# Patient Record
Sex: MALE | Race: WHITE | Employment: FULL TIME | URBAN - METROPOLITAN AREA
[De-identification: names, ages, dates, MRNs, and addresses within clinical notes are randomized per-mention and may not be internally consistent; named-entity substitution may affect disease eponyms.]

---

## 2023-09-30 ENCOUNTER — APPOINTMENT (OUTPATIENT)
Dept: MRI IMAGING | Facility: HOSPITAL | Age: 53
End: 2023-09-30
Attending: HOSPITALIST
Payer: COMMERCIAL

## 2023-09-30 ENCOUNTER — APPOINTMENT (OUTPATIENT)
Dept: CT IMAGING | Facility: HOSPITAL | Age: 53
End: 2023-09-30
Attending: EMERGENCY MEDICINE
Payer: COMMERCIAL

## 2023-09-30 ENCOUNTER — APPOINTMENT (OUTPATIENT)
Dept: GENERAL RADIOLOGY | Facility: HOSPITAL | Age: 53
End: 2023-09-30
Attending: HOSPITALIST
Payer: COMMERCIAL

## 2023-09-30 ENCOUNTER — HOSPITAL ENCOUNTER (INPATIENT)
Facility: HOSPITAL | Age: 53
LOS: 12 days | Discharge: HOME HEALTH | End: 2023-10-12
Attending: EMERGENCY MEDICINE | Admitting: INTERNAL MEDICINE
Payer: COMMERCIAL

## 2023-09-30 ENCOUNTER — APPOINTMENT (OUTPATIENT)
Dept: GENERAL RADIOLOGY | Facility: HOSPITAL | Age: 53
End: 2023-09-30
Attending: EMERGENCY MEDICINE
Payer: COMMERCIAL

## 2023-09-30 DIAGNOSIS — L03.114 CELLULITIS OF LEFT UPPER EXTREMITY: Primary | ICD-10-CM

## 2023-09-30 DIAGNOSIS — L03.90 CELLULITIS: ICD-10-CM

## 2023-09-30 DIAGNOSIS — L03.114 CELLULITIS OF LEFT FOREARM: ICD-10-CM
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
## 2023-09-30 DIAGNOSIS — A41.9 SEPSIS, DUE TO UNSPECIFIED ORGANISM, UNSPECIFIED WHETHER ACUTE ORGAN DYSFUNCTION PRESENT (HCC): ICD-10-CM

## 2023-09-30 DIAGNOSIS — D70.9 NEUTROPENIA, UNSPECIFIED TYPE (HCC): ICD-10-CM

## 2023-09-30 LAB
ALBUMIN SERPL-MCNC: 3.1 G/DL (ref 3.4–5)
ALBUMIN/GLOB SERPL: 0.6 {RATIO} (ref 1–2)
ALP LIVER SERPL-CCNC: 43 U/L
ALT SERPL-CCNC: 47 U/L
ANION GAP SERPL CALC-SCNC: 7 MMOL/L (ref 0–18)
APTT PPP: 37.8 SECONDS (ref 23.3–35.6)
AST SERPL-CCNC: 28 U/L (ref 15–37)
BASOPHILS # BLD AUTO: 0 X10(3) UL (ref 0–0.2)
BASOPHILS NFR BLD AUTO: 0 %
BILIRUB SERPL-MCNC: 0.6 MG/DL (ref 0.1–2)
BUN BLD-MCNC: 12 MG/DL (ref 7–18)
CALCIUM BLD-MCNC: 8.2 MG/DL (ref 8.5–10.1)
CHLORIDE SERPL-SCNC: 104 MMOL/L (ref 98–112)
CO2 SERPL-SCNC: 21 MMOL/L (ref 21–32)
CREAT BLD-MCNC: 0.85 MG/DL
D DIMER PPP FEU-MCNC: 1.24 UG/ML FEU (ref ?–0.53)
EGFRCR SERPLBLD CKD-EPI 2021: 104 ML/MIN/1.73M2 (ref 60–?)
EOSINOPHIL # BLD AUTO: 0 X10(3) UL (ref 0–0.7)
EOSINOPHIL NFR BLD AUTO: 0 %
ERYTHROCYTE [DISTWIDTH] IN BLOOD BY AUTOMATED COUNT: 14 %
FIBRINOGEN PPP-MCNC: 525 MG/DL (ref 180–480)
GLOBULIN PLAS-MCNC: 4.8 G/DL (ref 2.8–4.4)
GLUCOSE BLD-MCNC: 121 MG/DL (ref 70–99)
HCT VFR BLD AUTO: 36.6 %
HGB BLD-MCNC: 12.8 G/DL
IMM GRANULOCYTES # BLD AUTO: 0.01 X10(3) UL (ref 0–1)
IMM GRANULOCYTES NFR BLD: 0.6 %
INR BLD: 1.29 (ref 0.85–1.16)
LACTATE SERPL-SCNC: 0.9 MMOL/L (ref 0.4–2)
LYMPHOCYTES # BLD AUTO: 0.97 X10(3) UL (ref 1–4)
LYMPHOCYTES NFR BLD AUTO: 59.9 %
MCH RBC QN AUTO: 27.8 PG (ref 26–34)
MCHC RBC AUTO-ENTMCNC: 35 G/DL (ref 31–37)
MCV RBC AUTO: 79.4 FL
MONOCYTES # BLD AUTO: 0.51 X10(3) UL (ref 0.1–1)
MONOCYTES NFR BLD AUTO: 31.5 %
NEUTROPHILS # BLD AUTO: 0.13 X10 (3) UL (ref 1.5–7.7)
NEUTROPHILS # BLD AUTO: 0.13 X10(3) UL (ref 1.5–7.7)
NEUTROPHILS NFR BLD AUTO: 8 %
OSMOLALITY SERPL CALC.SUM OF ELEC: 275 MOSM/KG (ref 275–295)
PLATELET # BLD AUTO: 111 10(3)UL (ref 150–450)
POTASSIUM SERPL-SCNC: 4.1 MMOL/L (ref 3.5–5.1)
PROT SERPL-MCNC: 7.9 G/DL (ref 6.4–8.2)
PROTHROMBIN TIME: 16.1 SECONDS (ref 11.6–14.8)
RBC # BLD AUTO: 4.61 X10(6)UL
SODIUM SERPL-SCNC: 132 MMOL/L (ref 136–145)
WBC # BLD AUTO: 1.6 X10(3) UL (ref 4–11)

## 2023-09-30 PROCEDURE — 73220 MRI UPPR EXTREMITY W/O&W/DYE: CPT | Performed by: HOSPITALIST

## 2023-09-30 PROCEDURE — 70150 X-RAY EXAM OF FACIAL BONES: CPT | Performed by: HOSPITALIST

## 2023-09-30 PROCEDURE — 87040 BLOOD CULTURE FOR BACTERIA: CPT | Performed by: EMERGENCY MEDICINE

## 2023-09-30 PROCEDURE — 83605 ASSAY OF LACTIC ACID: CPT | Performed by: EMERGENCY MEDICINE

## 2023-09-30 PROCEDURE — 85384 FIBRINOGEN ACTIVITY: CPT | Performed by: EMERGENCY MEDICINE

## 2023-09-30 PROCEDURE — 85730 THROMBOPLASTIN TIME PARTIAL: CPT | Performed by: EMERGENCY MEDICINE

## 2023-09-30 PROCEDURE — 73110 X-RAY EXAM OF WRIST: CPT | Performed by: EMERGENCY MEDICINE

## 2023-09-30 PROCEDURE — A9575 INJ GADOTERATE MEGLUMI 0.1ML: HCPCS | Performed by: INTERNAL MEDICINE

## 2023-09-30 PROCEDURE — 85025 COMPLETE CBC W/AUTO DIFF WBC: CPT | Performed by: EMERGENCY MEDICINE

## 2023-09-30 PROCEDURE — 73201 CT UPPER EXTREMITY W/DYE: CPT | Performed by: EMERGENCY MEDICINE

## 2023-09-30 PROCEDURE — 70030 X-RAY EYE FOR FOREIGN BODY: CPT | Performed by: HOSPITALIST

## 2023-09-30 PROCEDURE — 85379 FIBRIN DEGRADATION QUANT: CPT | Performed by: EMERGENCY MEDICINE

## 2023-09-30 PROCEDURE — 80053 COMPREHEN METABOLIC PANEL: CPT | Performed by: EMERGENCY MEDICINE

## 2023-09-30 PROCEDURE — 85610 PROTHROMBIN TIME: CPT | Performed by: EMERGENCY MEDICINE

## 2023-09-30 RX ORDER — ACETAMINOPHEN 500 MG
1000 TABLET ORAL ONCE
Status: COMPLETED | OUTPATIENT
Start: 2023-09-30 | End: 2023-09-30

## 2023-09-30 RX ORDER — SODIUM CHLORIDE, SODIUM LACTATE, POTASSIUM CHLORIDE, CALCIUM CHLORIDE 600; 310; 30; 20 MG/100ML; MG/100ML; MG/100ML; MG/100ML
INJECTION, SOLUTION INTRAVENOUS CONTINUOUS
Status: DISCONTINUED | OUTPATIENT
Start: 2023-09-30 | End: 2023-10-04

## 2023-09-30 RX ORDER — BISACODYL 10 MG
10 SUPPOSITORY, RECTAL RECTAL
Status: DISCONTINUED | OUTPATIENT
Start: 2023-09-30 | End: 2023-10-12

## 2023-09-30 RX ORDER — SENNOSIDES 8.6 MG
17.2 TABLET ORAL NIGHTLY PRN
Status: DISCONTINUED | OUTPATIENT
Start: 2023-09-30 | End: 2023-10-12

## 2023-09-30 RX ORDER — HYDROCODONE BITARTRATE AND ACETAMINOPHEN 5; 325 MG/1; MG/1
1 TABLET ORAL EVERY 4 HOURS PRN
Status: DISCONTINUED | OUTPATIENT
Start: 2023-09-30 | End: 2023-10-12

## 2023-09-30 RX ORDER — POLYETHYLENE GLYCOL 3350 17 G/17G
17 POWDER, FOR SOLUTION ORAL DAILY PRN
Status: DISCONTINUED | OUTPATIENT
Start: 2023-09-30 | End: 2023-10-12

## 2023-09-30 RX ORDER — METRONIDAZOLE 500 MG/100ML
500 INJECTION, SOLUTION INTRAVENOUS ONCE
Status: COMPLETED | OUTPATIENT
Start: 2023-09-30 | End: 2023-09-30

## 2023-09-30 RX ORDER — ENEMA 19; 7 G/133ML; G/133ML
1 ENEMA RECTAL ONCE AS NEEDED
Status: DISCONTINUED | OUTPATIENT
Start: 2023-09-30 | End: 2023-10-12

## 2023-09-30 RX ORDER — HYDROCODONE BITARTRATE AND ACETAMINOPHEN 5; 325 MG/1; MG/1
2 TABLET ORAL EVERY 4 HOURS PRN
Status: DISCONTINUED | OUTPATIENT
Start: 2023-09-30 | End: 2023-10-12

## 2023-09-30 RX ORDER — MORPHINE SULFATE 2 MG/ML
2 INJECTION, SOLUTION INTRAMUSCULAR; INTRAVENOUS EVERY 2 HOUR PRN
Status: DISCONTINUED | OUTPATIENT
Start: 2023-09-30 | End: 2023-10-12

## 2023-09-30 RX ORDER — SODIUM CHLORIDE 9 MG/ML
INJECTION, SOLUTION INTRAVENOUS CONTINUOUS
Status: ACTIVE | OUTPATIENT
Start: 2023-09-30 | End: 2023-09-30

## 2023-09-30 RX ORDER — METRONIDAZOLE 500 MG/100ML
500 INJECTION, SOLUTION INTRAVENOUS EVERY 8 HOURS
Status: DISCONTINUED | OUTPATIENT
Start: 2023-09-30 | End: 2023-10-02

## 2023-09-30 RX ORDER — PROCHLORPERAZINE EDISYLATE 5 MG/ML
5 INJECTION INTRAMUSCULAR; INTRAVENOUS EVERY 8 HOURS PRN
Status: DISCONTINUED | OUTPATIENT
Start: 2023-09-30 | End: 2023-10-12

## 2023-09-30 RX ORDER — ENOXAPARIN SODIUM 100 MG/ML
40 INJECTION SUBCUTANEOUS DAILY
Status: DISCONTINUED | OUTPATIENT
Start: 2023-10-01 | End: 2023-10-05

## 2023-09-30 RX ORDER — MORPHINE SULFATE 2 MG/ML
1 INJECTION, SOLUTION INTRAMUSCULAR; INTRAVENOUS EVERY 2 HOUR PRN
Status: DISCONTINUED | OUTPATIENT
Start: 2023-09-30 | End: 2023-10-12

## 2023-09-30 RX ORDER — ACETAMINOPHEN 325 MG/1
650 TABLET ORAL EVERY 4 HOURS PRN
Status: DISCONTINUED | OUTPATIENT
Start: 2023-09-30 | End: 2023-10-12

## 2023-09-30 RX ORDER — MORPHINE SULFATE 4 MG/ML
4 INJECTION, SOLUTION INTRAMUSCULAR; INTRAVENOUS EVERY 2 HOUR PRN
Status: DISCONTINUED | OUTPATIENT
Start: 2023-09-30 | End: 2023-10-12

## 2023-09-30 RX ORDER — GADOTERATE MEGLUMINE 376.9 MG/ML
20 INJECTION INTRAVENOUS
Status: COMPLETED | OUTPATIENT
Start: 2023-09-30 | End: 2023-09-30

## 2023-09-30 RX ORDER — ONDANSETRON 2 MG/ML
4 INJECTION INTRAMUSCULAR; INTRAVENOUS EVERY 6 HOURS PRN
Status: DISCONTINUED | OUTPATIENT
Start: 2023-09-30 | End: 2023-10-12

## 2023-09-30 RX ORDER — VANCOMYCIN 2 GRAM/500 ML IN 0.9 % SODIUM CHLORIDE INTRAVENOUS
2000 ONCE
Status: COMPLETED | OUTPATIENT
Start: 2023-09-30 | End: 2023-09-30

## 2023-09-30 NOTE — ED INITIAL ASSESSMENT (HPI)
Pt. Presents to ED after being bit by a cat on Thursday. Cat bite to left wrist. Patient not previously evaluated for bite. Not currently on antibiotics. Was seen at [address] [place] [address] today and given one dose of IV antibiotics.

## 2023-09-30 NOTE — PROGRESS NOTES
Ortho Consult    Left forearm cat bite and cellulitis  Thomas out cellulitis  Appreciate ID recs  NPO Mn  Added on for OR tomorrow if condition does not improve    Wicho Christopher MD  [facility]

## 2023-09-30 NOTE — PLAN OF CARE
Patient is alert and oriented x4. Pain last rated a 6/10. PRN Medication given. No complaint of numbness or tingling. Pulses bilateral +2. Weak hand  to LUE d/t pain. LUE is Red and swollen, hot to the touch, no drainage noted. Vital signs in normal range, heart rate is tachy in the 120's, hospitalist notified. Patient ambulates ad barbara with steady gait. IS and ankle pumps encouraged. Belongings within reach. Encouraged to call for any needs.

## 2023-09-30 NOTE — ED QUICK NOTES
Orders for admission, patient is aware of plan and ready to go upstairs. Any questions, please call ED RN margarita at extension [phone].      Patient Covid vaccination status: Fully vaccinated     COVID Test Ordered in ED: None    COVID Suspicion at Admission: N/A    Running Infusions:      Mental Status/LOC at time of transport: x3    Other pertinent information:   CIWA score: N/A   NIH score:  N/A

## 2023-10-01 ENCOUNTER — ANESTHESIA (OUTPATIENT)
Dept: SURGERY | Facility: HOSPITAL | Age: 53
End: 2023-10-01
Payer: COMMERCIAL

## 2023-10-01 ENCOUNTER — ANESTHESIA EVENT (OUTPATIENT)
Dept: SURGERY | Facility: HOSPITAL | Age: 53
End: 2023-10-01
Payer: COMMERCIAL

## 2023-10-01 LAB
ALBUMIN SERPL-MCNC: 2.7 G/DL (ref 3.4–5)
ALBUMIN/GLOB SERPL: 0.7 {RATIO} (ref 1–2)
ALP LIVER SERPL-CCNC: 48 U/L
ALT SERPL-CCNC: 39 U/L
ANION GAP SERPL CALC-SCNC: 7 MMOL/L (ref 0–18)
AST SERPL-CCNC: 17 U/L (ref 15–37)
BASOPHILS # BLD AUTO: 0 X10(3) UL (ref 0–0.2)
BASOPHILS NFR BLD AUTO: 0 %
BILIRUB SERPL-MCNC: 0.5 MG/DL (ref 0.1–2)
BUN BLD-MCNC: 10 MG/DL (ref 7–18)
CALCIUM BLD-MCNC: 8 MG/DL (ref 8.5–10.1)
CHLORIDE SERPL-SCNC: 104 MMOL/L (ref 98–112)
CO2 SERPL-SCNC: 22 MMOL/L (ref 21–32)
CREAT BLD-MCNC: 0.89 MG/DL
D DIMER PPP FEU-MCNC: 1.46 UG/ML FEU (ref ?–0.53)
EGFRCR SERPLBLD CKD-EPI 2021: 102 ML/MIN/1.73M2 (ref 60–?)
EOSINOPHIL # BLD AUTO: 0 X10(3) UL (ref 0–0.7)
EOSINOPHIL NFR BLD AUTO: 0 %
ERYTHROCYTE [DISTWIDTH] IN BLOOD BY AUTOMATED COUNT: 13.9 %
FIBRINOGEN PPP-MCNC: 558 MG/DL (ref 180–480)
GLOBULIN PLAS-MCNC: 4 G/DL (ref 2.8–4.4)
GLUCOSE BLD-MCNC: 117 MG/DL (ref 70–99)
HCT VFR BLD AUTO: 32.4 %
HGB BLD-MCNC: 11.2 G/DL
IMM GRANULOCYTES # BLD AUTO: 0.01 X10(3) UL (ref 0–1)
IMM GRANULOCYTES NFR BLD: 0.6 %
INR BLD: 1.28 (ref 0.85–1.16)
LYMPHOCYTES # BLD AUTO: 0.98 X10(3) UL (ref 1–4)
LYMPHOCYTES NFR BLD AUTO: 58.7 %
MAGNESIUM SERPL-MCNC: 1.9 MG/DL (ref 1.6–2.6)
MCH RBC QN AUTO: 27.5 PG (ref 26–34)
MCHC RBC AUTO-ENTMCNC: 34.6 G/DL (ref 31–37)
MCV RBC AUTO: 79.6 FL
MONOCYTES # BLD AUTO: 0.54 X10(3) UL (ref 0.1–1)
MONOCYTES NFR BLD AUTO: 32.3 %
NEUTROPHILS # BLD AUTO: 0.14 X10 (3) UL (ref 1.5–7.7)
NEUTROPHILS # BLD AUTO: 0.14 X10(3) UL (ref 1.5–7.7)
NEUTROPHILS NFR BLD AUTO: 8.4 %
OSMOLALITY SERPL CALC.SUM OF ELEC: 276 MOSM/KG (ref 275–295)
PLATELET # BLD AUTO: 99 10(3)UL (ref 150–450)
POTASSIUM SERPL-SCNC: 3.7 MMOL/L (ref 3.5–5.1)
PROT SERPL-MCNC: 6.7 G/DL (ref 6.4–8.2)
PROTHROMBIN TIME: 16.1 SECONDS (ref 11.6–14.8)
RBC # BLD AUTO: 4.07 X10(6)UL
SODIUM SERPL-SCNC: 133 MMOL/L (ref 136–145)
WBC # BLD AUTO: 1.7 X10(3) UL (ref 4–11)

## 2023-10-01 PROCEDURE — 94640 AIRWAY INHALATION TREATMENT: CPT

## 2023-10-01 PROCEDURE — 87077 CULTURE AEROBIC IDENTIFY: CPT | Performed by: ORTHOPAEDIC SURGERY

## 2023-10-01 PROCEDURE — 87176 TISSUE HOMOGENIZATION CULTR: CPT | Performed by: ORTHOPAEDIC SURGERY

## 2023-10-01 PROCEDURE — 85610 PROTHROMBIN TIME: CPT | Performed by: HOSPITALIST

## 2023-10-01 PROCEDURE — 85384 FIBRINOGEN ACTIVITY: CPT | Performed by: HOSPITALIST

## 2023-10-01 PROCEDURE — 87075 CULTR BACTERIA EXCEPT BLOOD: CPT | Performed by: ORTHOPAEDIC SURGERY

## 2023-10-01 PROCEDURE — 83735 ASSAY OF MAGNESIUM: CPT | Performed by: HOSPITALIST

## 2023-10-01 PROCEDURE — 87076 CULTURE ANAEROBE IDENT EACH: CPT | Performed by: ORTHOPAEDIC SURGERY

## 2023-10-01 PROCEDURE — 87205 SMEAR GRAM STAIN: CPT | Performed by: ORTHOPAEDIC SURGERY

## 2023-10-01 PROCEDURE — 0RBP0ZZ EXCISION OF LEFT WRIST JOINT, OPEN APPROACH: ICD-10-PCS | Performed by: ORTHOPAEDIC SURGERY

## 2023-10-01 PROCEDURE — 85025 COMPLETE CBC W/AUTO DIFF WBC: CPT | Performed by: HOSPITALIST

## 2023-10-01 PROCEDURE — 0J9K0ZZ DRAINAGE OF LEFT HAND SUBCUTANEOUS TISSUE AND FASCIA, OPEN APPROACH: ICD-10-PCS | Performed by: ORTHOPAEDIC SURGERY

## 2023-10-01 PROCEDURE — 85379 FIBRIN DEGRADATION QUANT: CPT | Performed by: HOSPITALIST

## 2023-10-01 PROCEDURE — 87070 CULTURE OTHR SPECIMN AEROBIC: CPT | Performed by: ORTHOPAEDIC SURGERY

## 2023-10-01 PROCEDURE — 80053 COMPREHEN METABOLIC PANEL: CPT | Performed by: HOSPITALIST

## 2023-10-01 RX ORDER — DIPHENHYDRAMINE HYDROCHLORIDE 50 MG/ML
12.5 INJECTION INTRAMUSCULAR; INTRAVENOUS AS NEEDED
Status: DISCONTINUED | OUTPATIENT
Start: 2023-10-01 | End: 2023-10-01 | Stop reason: HOSPADM

## 2023-10-01 RX ORDER — CEFAZOLIN SODIUM 1 G/3ML
INJECTION, POWDER, FOR SOLUTION INTRAMUSCULAR; INTRAVENOUS AS NEEDED
Status: DISCONTINUED | OUTPATIENT
Start: 2023-10-01 | End: 2023-10-01 | Stop reason: SURG

## 2023-10-01 RX ORDER — BUPIVACAINE HYDROCHLORIDE 5 MG/ML
INJECTION, SOLUTION EPIDURAL; INTRACAUDAL AS NEEDED
Status: DISCONTINUED | OUTPATIENT
Start: 2023-10-01 | End: 2023-10-01 | Stop reason: HOSPADM

## 2023-10-01 RX ORDER — VANCOMYCIN HYDROCHLORIDE
15 EVERY 12 HOURS
Status: DISCONTINUED | OUTPATIENT
Start: 2023-10-01 | End: 2023-10-03

## 2023-10-01 RX ORDER — PROCHLORPERAZINE EDISYLATE 5 MG/ML
5 INJECTION INTRAMUSCULAR; INTRAVENOUS EVERY 8 HOURS PRN
Status: DISCONTINUED | OUTPATIENT
Start: 2023-10-01 | End: 2023-10-01 | Stop reason: HOSPADM

## 2023-10-01 RX ORDER — PHENYLEPHRINE HCL 10 MG/ML
VIAL (ML) INJECTION AS NEEDED
Status: DISCONTINUED | OUTPATIENT
Start: 2023-10-01 | End: 2023-10-01 | Stop reason: SURG

## 2023-10-01 RX ORDER — HYDROMORPHONE HYDROCHLORIDE 1 MG/ML
0.2 INJECTION, SOLUTION INTRAMUSCULAR; INTRAVENOUS; SUBCUTANEOUS EVERY 5 MIN PRN
Status: DISCONTINUED | OUTPATIENT
Start: 2023-10-01 | End: 2023-10-01 | Stop reason: HOSPADM

## 2023-10-01 RX ORDER — HYDROCODONE BITARTRATE AND ACETAMINOPHEN 5; 325 MG/1; MG/1
1 TABLET ORAL ONCE AS NEEDED
Status: DISCONTINUED | OUTPATIENT
Start: 2023-10-01 | End: 2023-10-01 | Stop reason: HOSPADM

## 2023-10-01 RX ORDER — LIDOCAINE HYDROCHLORIDE 10 MG/ML
INJECTION, SOLUTION EPIDURAL; INFILTRATION; INTRACAUDAL; PERINEURAL AS NEEDED
Status: DISCONTINUED | OUTPATIENT
Start: 2023-10-01 | End: 2023-10-01 | Stop reason: SURG

## 2023-10-01 RX ORDER — ONDANSETRON 2 MG/ML
INJECTION INTRAMUSCULAR; INTRAVENOUS AS NEEDED
Status: DISCONTINUED | OUTPATIENT
Start: 2023-10-01 | End: 2023-10-01 | Stop reason: SURG

## 2023-10-01 RX ORDER — ONDANSETRON 2 MG/ML
4 INJECTION INTRAMUSCULAR; INTRAVENOUS EVERY 6 HOURS PRN
Status: DISCONTINUED | OUTPATIENT
Start: 2023-10-01 | End: 2023-10-01 | Stop reason: HOSPADM

## 2023-10-01 RX ORDER — HYDROCODONE BITARTRATE AND ACETAMINOPHEN 5; 325 MG/1; MG/1
2 TABLET ORAL ONCE AS NEEDED
Status: DISCONTINUED | OUTPATIENT
Start: 2023-10-01 | End: 2023-10-01 | Stop reason: HOSPADM

## 2023-10-01 RX ORDER — ACETAMINOPHEN 500 MG
1000 TABLET ORAL ONCE AS NEEDED
Status: DISCONTINUED | OUTPATIENT
Start: 2023-10-01 | End: 2023-10-01 | Stop reason: HOSPADM

## 2023-10-01 RX ORDER — NALOXONE HYDROCHLORIDE 0.4 MG/ML
0.08 INJECTION, SOLUTION INTRAMUSCULAR; INTRAVENOUS; SUBCUTANEOUS AS NEEDED
Status: DISCONTINUED | OUTPATIENT
Start: 2023-10-01 | End: 2023-10-01 | Stop reason: HOSPADM

## 2023-10-01 RX ORDER — SODIUM CHLORIDE, SODIUM LACTATE, POTASSIUM CHLORIDE, CALCIUM CHLORIDE 600; 310; 30; 20 MG/100ML; MG/100ML; MG/100ML; MG/100ML
INJECTION, SOLUTION INTRAVENOUS CONTINUOUS
Status: DISCONTINUED | OUTPATIENT
Start: 2023-10-01 | End: 2023-10-01 | Stop reason: HOSPADM

## 2023-10-01 RX ORDER — MEPERIDINE HYDROCHLORIDE 25 MG/ML
12.5 INJECTION INTRAMUSCULAR; INTRAVENOUS; SUBCUTANEOUS AS NEEDED
Status: DISCONTINUED | OUTPATIENT
Start: 2023-10-01 | End: 2023-10-01 | Stop reason: HOSPADM

## 2023-10-01 RX ORDER — HYDROMORPHONE HYDROCHLORIDE 1 MG/ML
0.6 INJECTION, SOLUTION INTRAMUSCULAR; INTRAVENOUS; SUBCUTANEOUS EVERY 5 MIN PRN
Status: DISCONTINUED | OUTPATIENT
Start: 2023-10-01 | End: 2023-10-01 | Stop reason: HOSPADM

## 2023-10-01 RX ORDER — IPRATROPIUM BROMIDE AND ALBUTEROL SULFATE 2.5; .5 MG/3ML; MG/3ML
3 SOLUTION RESPIRATORY (INHALATION)
Status: DISPENSED | OUTPATIENT
Start: 2023-10-01 | End: 2023-10-02

## 2023-10-01 RX ORDER — HYDROMORPHONE HYDROCHLORIDE 1 MG/ML
0.4 INJECTION, SOLUTION INTRAMUSCULAR; INTRAVENOUS; SUBCUTANEOUS EVERY 5 MIN PRN
Status: DISCONTINUED | OUTPATIENT
Start: 2023-10-01 | End: 2023-10-01 | Stop reason: HOSPADM

## 2023-10-01 RX ORDER — MIDAZOLAM HYDROCHLORIDE 1 MG/ML
1 INJECTION INTRAMUSCULAR; INTRAVENOUS EVERY 5 MIN PRN
Status: DISCONTINUED | OUTPATIENT
Start: 2023-10-01 | End: 2023-10-01 | Stop reason: HOSPADM

## 2023-10-01 RX ORDER — DEXAMETHASONE SODIUM PHOSPHATE 4 MG/ML
VIAL (ML) INJECTION AS NEEDED
Status: DISCONTINUED | OUTPATIENT
Start: 2023-10-01 | End: 2023-10-01 | Stop reason: SURG

## 2023-10-01 RX ORDER — MIDAZOLAM HYDROCHLORIDE 1 MG/ML
INJECTION INTRAMUSCULAR; INTRAVENOUS AS NEEDED
Status: DISCONTINUED | OUTPATIENT
Start: 2023-10-01 | End: 2023-10-01 | Stop reason: SURG

## 2023-10-01 RX ADMIN — ONDANSETRON 4 MG: 2 INJECTION INTRAMUSCULAR; INTRAVENOUS at 11:35:00

## 2023-10-01 RX ADMIN — SODIUM CHLORIDE, SODIUM LACTATE, POTASSIUM CHLORIDE, CALCIUM CHLORIDE: 600; 310; 30; 20 INJECTION, SOLUTION INTRAVENOUS at 11:56:00

## 2023-10-01 RX ADMIN — DEXAMETHASONE SODIUM PHOSPHATE 4 MG: 4 MG/ML VIAL (ML) INJECTION at 11:19:00

## 2023-10-01 RX ADMIN — PHENYLEPHRINE HCL 100 MCG: 10 MG/ML VIAL (ML) INJECTION at 11:30:00

## 2023-10-01 RX ADMIN — CEFAZOLIN SODIUM 2 G: 1 INJECTION, POWDER, FOR SOLUTION INTRAMUSCULAR; INTRAVENOUS at 11:30:00

## 2023-10-01 RX ADMIN — PHENYLEPHRINE HCL 100 MCG: 10 MG/ML VIAL (ML) INJECTION at 11:32:00

## 2023-10-01 RX ADMIN — LIDOCAINE HYDROCHLORIDE 5 ML: 10 INJECTION, SOLUTION EPIDURAL; INFILTRATION; INTRACAUDAL; PERINEURAL at 11:17:00

## 2023-10-01 RX ADMIN — MIDAZOLAM HYDROCHLORIDE 2 MG: 1 INJECTION INTRAMUSCULAR; INTRAVENOUS at 11:13:00

## 2023-10-01 RX ADMIN — SODIUM CHLORIDE, SODIUM LACTATE, POTASSIUM CHLORIDE, CALCIUM CHLORIDE: 600; 310; 30; 20 INJECTION, SOLUTION INTRAVENOUS at 11:13:00

## 2023-10-01 RX ADMIN — PHENYLEPHRINE HCL 100 MCG: 10 MG/ML VIAL (ML) INJECTION at 11:25:00

## 2023-10-01 RX ADMIN — PHENYLEPHRINE HCL 100 MCG: 10 MG/ML VIAL (ML) INJECTION at 11:22:00

## 2023-10-01 NOTE — PROGRESS NOTES
47 yo M sp left wrist I&D of cat bite, synovectomy    Appreciate cultures and ID recommendations, abx regimen  Start four time daily warm water soaks on 10/1, 6PM, 15 minutes  Soft dressing to hand after soaks, bacitracin, adaptic, kerlex  Monitor cellulitis    OT - start gentle finger and wrist ROM, fashion resting volar splint for soft tissue rest when not doing exercises    FU with me in 2 weeks in office    Ingrid Alegria MD  [address] [facility] Certified Orthopedic Surgeon  Hand and Upper Extremity Specialist  [address]  [email or web address]

## 2023-10-01 NOTE — PROGRESS NOTES
Pictures reviewed after first soaks  Ecchymoses stable, erythema improving  Plan for NPO MN and will examine tomorrow AM  Trend temperatures and labs  Appreciate cultures    Glenna Posey MD

## 2023-10-01 NOTE — PLAN OF CARE
A/Ox4, BP stable, afebrile, on RA. Xray and MRI done. Norco given for lt arm pain with adequate relief. Lt arm kept elevated, swelling present to hand and lower arm, no advancement of redness to marked areas. No drainage present to bite site, intact blood blister noted. Tolerating Rocephin and Metronidazole IV. Pt will be NPO at midnight, scheduled for surgery in AM. Pt updated with plan of care, verbalized understanding.

## 2023-10-01 NOTE — OPERATIVE REPORT
[place]  Patient Name: Rizwana Hatch  Age:  48year old  Sex: male  MRN: [id number]  : 3/9/1970  Date of Admission: 2023  Date of Surgery: 10/01/23  Primary Surgeon: Larry Lee MD  Assistant(s): None     Preoperative Diagnosis:   Left wrist cat bite  Left wrist infection  Left wrist infectious tenosynovitis     Postoperative Diagnosis:  Left wrist cat bite  Left wrist infection  Left wrist infectious tenosynovitis      Operation Performed:   1) Left wrist I&D of deep abscess from cat bite, CPT code 54412  2) Left wrist 1st extensor compartment synovectomy, CPT code 35865  3) Left wrist 2nd extensor compartment synovectomy, CPT code 72815  4) Left wrist 3rd extensor compartment synovectomy, CPT code 03837  5) Left wrist Left wrist 4th extensor compartment synovectomy, CPT code 49535     Implants: none     Anesthesia: general    Complications: none    Estimated Blood Loss: 1 mL    Tourniquet Time: See OR record    Specimens: Tissue culture, swab culture, synovium    Antibiotics: Given    INDICATIONS FOR SURGERY:   The patient is a 48year old year-old male with history of cat bite to left wrist 4 days prior with persistent pain and swelling  MRI shows possible early fluid accumulation and tenosynovitis of radial sided extensor compartments. The patient was indicated for I&D    The patient understands the natural history of their disease and all their options. Risks and benefits, as well as alternatives, of surgery were discussed in detail. Risks including infection, bleeding, damage to nerves, arteries, tendons, persistent pain, failure of surgery to relieve all pain/dysfunction/deformity, malunion, nonunion (if a bony injury), recurrence of problem, and need for further surgery were all discussed. Risks for anesthesia complications, DVT and death were also discussed  All questions were answered and they agree to the plan.     OPERATIVE FINDINGS:  Deep abscess around dorsal extensor compartments, deep synovitis of 1-4 compartments     OPERATIVE PROCEDURE:  The patient was seen in the preoperative holding area, where the correct site was marked. All questions regarding the procedure and postoperative rehabilitation were discussed. The consent was signed. H&P and allergies were reviewed. The patient was brought to the operating room and transferred to the OR table where General Anesthesia was induced without immediate complication. A time-out was performed confirming laterality and site. A high Left axillary tourniquet was placed and was well padded  The Left upper extremity was prepped and draped in the standard fashion. A single dose of preoperative antibiotics was administered. 10 cc of 1% lidocaine and 0.5% marcaine in a 50/50 mix without epinephrine was then injected into left dorsal wrist   Esmarch bandage was then used to exsanguinate the operative limb and the tourniquet was raised to 250 mm hg     The cat bite wound located at the dorsal radial wrist was extended distally and proximally along the wrist   Blunt dissection was completed and there was serous cloudy drainage under the subcutaneous tissues, this tracked deep around the tendon and joint capsule   there was not extension into the joint capsule it self  Copious irrigation was utilized to wash these tissues out    The 1st, 2nd, 3rd and 4th dorsal compartment tendons were isolated and there was cloudy synovitis surrounding all tendons that were sharply dissected out.  Complete synovectomy was done to all 4 compartments with a knife and sent for culture as well as fluid from the abscess     Tourniquet was then released and the fingers were warm and well perfused     The wound was closed with loose 4-0 nylon sutures      The wound was dressed with bacitracin, adaptic, 4x4s and soft bulky dressing     The patient was allowed to awaken from the anesthetic, appeared to have tolerated the procedure well without immediate complication, and was transferred to the recovery room in stable condition.      Carmelita Tamayo MD  Orthopedic Surgery

## 2023-10-01 NOTE — PLAN OF CARE
Pt is Aox4, moderate pain at rest relieved with PRN meds, VSS on RA, patient is tachy in the 120's at rest, MD aware, weak hand  to LUE, redness, swelling, blister to bite erin on arm noted, IV abx as ordered, NPO at this time, up ad barbara call light and belongings within reach, encouraged to call for any needs.     Plan surgery at 0950 this morning with Dr. Sheryl Baca, consent on chart,

## 2023-10-01 NOTE — ANESTHESIA PROCEDURE NOTES
Airway  Date/Time: 10/1/2023 11:18 AM  Urgency: elective      General Information and Staff    Patient location during procedure: OR  Anesthesiologist: Clayton Moreno MD  Performed: anesthesiologist   Performed by: Clayton Moreno MD  Authorized by: Clayton Moreno MD      Indications and Patient Condition  Indications for airway management: anesthesia  Sedation level: deep  Preoxygenated: yes  Patient position: sniffing  Mask difficulty assessment: 1 - vent by mask    Final Airway Details  Final airway type: supraglottic airway      Successful airway: classic  Size 4       Number of attempts at approach: 1

## 2023-10-02 LAB
ANION GAP SERPL CALC-SCNC: 7 MMOL/L (ref 0–18)
APTT PPP: 29 SECONDS (ref 23.3–35.6)
ATRIAL RATE: 106 BPM
BASOPHILS # BLD AUTO: 0 X10(3) UL (ref 0–0.2)
BASOPHILS NFR BLD AUTO: 0 %
BUN BLD-MCNC: 10 MG/DL (ref 7–18)
C DIFF TOX B STL QL: NEGATIVE
CALCIUM BLD-MCNC: 7.8 MG/DL (ref 8.5–10.1)
CHLORIDE SERPL-SCNC: 105 MMOL/L (ref 98–112)
CO2 SERPL-SCNC: 24 MMOL/L (ref 21–32)
CREAT BLD-MCNC: 0.74 MG/DL
CREAT BLD-MCNC: 0.74 MG/DL
D DIMER PPP FEU-MCNC: 2.39 UG/ML FEU (ref ?–0.53)
EGFRCR SERPLBLD CKD-EPI 2021: 108 ML/MIN/1.73M2 (ref 60–?)
EGFRCR SERPLBLD CKD-EPI 2021: 108 ML/MIN/1.73M2 (ref 60–?)
EOSINOPHIL # BLD AUTO: 0 X10(3) UL (ref 0–0.7)
EOSINOPHIL NFR BLD AUTO: 0 %
ERYTHROCYTE [DISTWIDTH] IN BLOOD BY AUTOMATED COUNT: 14 %
FIBRINOGEN PPP-MCNC: 686 MG/DL (ref 180–480)
GLUCOSE BLD-MCNC: 114 MG/DL (ref 70–99)
HCT VFR BLD AUTO: 30.8 %
HGB BLD-MCNC: 10.6 G/DL
IMM GRANULOCYTES # BLD AUTO: 0 X10(3) UL (ref 0–1)
IMM GRANULOCYTES NFR BLD: 0 %
INR BLD: 1.22 (ref 0.85–1.16)
LYMPHOCYTES # BLD AUTO: 0.74 X10(3) UL (ref 1–4)
LYMPHOCYTES NFR BLD AUTO: 57.8 %
MCH RBC QN AUTO: 27.6 PG (ref 26–34)
MCHC RBC AUTO-ENTMCNC: 34.4 G/DL (ref 31–37)
MCV RBC AUTO: 80.2 FL
MONOCYTES # BLD AUTO: 0.41 X10(3) UL (ref 0.1–1)
MONOCYTES NFR BLD AUTO: 32 %
NEUTROPHILS # BLD AUTO: 0.13 X10 (3) UL (ref 1.5–7.7)
NEUTROPHILS # BLD AUTO: 0.13 X10(3) UL (ref 1.5–7.7)
NEUTROPHILS NFR BLD AUTO: 10.2 %
OSMOLALITY SERPL CALC.SUM OF ELEC: 282 MOSM/KG (ref 275–295)
P AXIS: 62 DEGREES
P-R INTERVAL: 162 MS
PLATELET # BLD AUTO: 87 10(3)UL (ref 150–450)
POTASSIUM SERPL-SCNC: 4 MMOL/L (ref 3.5–5.1)
PROTHROMBIN TIME: 15.5 SECONDS (ref 11.6–14.8)
Q-T INTERVAL: 332 MS
QRS DURATION: 102 MS
QTC CALCULATION (BEZET): 441 MS
R AXIS: 50 DEGREES
RBC # BLD AUTO: 3.84 X10(6)UL
SODIUM SERPL-SCNC: 136 MMOL/L (ref 136–145)
T AXIS: 52 DEGREES
VENTRICULAR RATE: 106 BPM
WBC # BLD AUTO: 1.3 X10(3) UL (ref 4–11)

## 2023-10-02 PROCEDURE — 93005 ELECTROCARDIOGRAM TRACING: CPT

## 2023-10-02 PROCEDURE — 85025 COMPLETE CBC W/AUTO DIFF WBC: CPT | Performed by: ORTHOPAEDIC SURGERY

## 2023-10-02 PROCEDURE — 97166 OT EVAL MOD COMPLEX 45 MIN: CPT

## 2023-10-02 PROCEDURE — 80048 BASIC METABOLIC PNL TOTAL CA: CPT | Performed by: ORTHOPAEDIC SURGERY

## 2023-10-02 PROCEDURE — 87493 C DIFF AMPLIFIED PROBE: CPT | Performed by: STUDENT IN AN ORGANIZED HEALTH CARE EDUCATION/TRAINING PROGRAM

## 2023-10-02 PROCEDURE — 85379 FIBRIN DEGRADATION QUANT: CPT | Performed by: STUDENT IN AN ORGANIZED HEALTH CARE EDUCATION/TRAINING PROGRAM

## 2023-10-02 PROCEDURE — 82565 ASSAY OF CREATININE: CPT | Performed by: INTERNAL MEDICINE

## 2023-10-02 PROCEDURE — 85384 FIBRINOGEN ACTIVITY: CPT | Performed by: STUDENT IN AN ORGANIZED HEALTH CARE EDUCATION/TRAINING PROGRAM

## 2023-10-02 PROCEDURE — 97760 ORTHOTIC MGMT&TRAING 1ST ENC: CPT

## 2023-10-02 PROCEDURE — 93010 ELECTROCARDIOGRAM REPORT: CPT | Performed by: INTERNAL MEDICINE

## 2023-10-02 PROCEDURE — 85730 THROMBOPLASTIN TIME PARTIAL: CPT | Performed by: STUDENT IN AN ORGANIZED HEALTH CARE EDUCATION/TRAINING PROGRAM

## 2023-10-02 PROCEDURE — 85610 PROTHROMBIN TIME: CPT | Performed by: STUDENT IN AN ORGANIZED HEALTH CARE EDUCATION/TRAINING PROGRAM

## 2023-10-02 RX ORDER — METRONIDAZOLE 500 MG/1
500 TABLET ORAL EVERY 8 HOURS SCHEDULED
Status: DISCONTINUED | OUTPATIENT
Start: 2023-10-02 | End: 2023-10-08

## 2023-10-02 NOTE — PLAN OF CARE
Patient A/O x4, VSS on RA-3L O2 PRN, c/o moderate pain. Voiding freely via toilet, last BM 9/29. Warm water soaks and wound care QID. Plan for OT eval, IV abx, and pend OR cx. Safety measures in place.

## 2023-10-02 NOTE — OCCUPATIONAL THERAPY NOTE
OT ADULT SPLINT/SLING EVALUATION - INPATIENT    Room Number: 384/384-A  Evaluation Date: 10/2/2023   Type of Evaluation: Initial  Re-Evaluation Reason:   Presenting Problem: cat bite  Orders: fashion a custom volar  wrist splint to be worn for soft tissue rest to protect wound, do not block MCPs and allow for free finger motion        SUBJECTIVE  Pt stated, \"I need to sit down    Onset Date: 9/28/2023    Mechanism of Injury:   Cat Bite  Surgery: Yes  Type of Splint/Sling Requested: fashion a custom volar  wrist splint to be worn for soft tissue rest to protect wound, do not block MCPs and allow for free finger motion    Related Medical History: I&D of LUE on 10/1/2023  Medications: pain medications and antibiotics  Psychosocial: NA    Hand Dominance: right  Functional Limitations Reported: none  Prior Level of Functioning: Independent  Patient Stated Goals: to move hand more    OBJECTIVE  Pt able to complete toileting and dressing up in bathroom and fabricated wrist splint, pt states he is unable to move wrist at all and that he cannot supinate hand to fabricate splint, splint made at angle that pt stated he could hold hand, pt will need splint modified as swelling decreases and as pt can move more, splint was flared out around stitches to avoid rubbing stitches    Splint/Sling fabricated/dispensed: Volar hand splint as ordered by MD    Education and Instruction  Patient splint/sling wear schedule: 24/7  Patient/staff instructed in precautions for splint/sling including excessive swelling, pain, pressure irritation, stiffness, and change in color. Patient to notify therapist if the above occur.   Further precautions: 4 time daily warm water soaks, then dress with bacitracin, adaptic and soft dressing   Monitor cellulitis, gentle short arc wrist range of motion and finger motion 5 times daily, encourage elevation  Patient/staff instructed to notify therapist if adjustments need to be made,  Patient/staff instructed in cleaning: clean splint using soap and lukewarm water, remove spots with ink or spot cleanser with chlorine, clean with alcohol swabs. Written instructions left/posted for patient/staff    ASSESSMENT  Pt with extreme swelling and limited ROM that limit ability to have splint fully fabricated, will need to modify splint as this improves. Splint is help on with Ace bandage as pt declined use of straps    Splint/sling fits well and patient/staff demonstrates retention of instructions and care plan.   Rehab Facilitators: motivated, family support, intact cognition, and high PLF  Rehab Barriers: none    PLAN  OT will follow to modify splint as needed    Evaluation Charged  Yes   Treatment Charge  15   Total Time with Patient (min)  25     Reviewed By GATITO Brown, OTR/L

## 2023-10-03 ENCOUNTER — APPOINTMENT (OUTPATIENT)
Dept: GENERAL RADIOLOGY | Facility: HOSPITAL | Age: 53
End: 2023-10-03
Attending: STUDENT IN AN ORGANIZED HEALTH CARE EDUCATION/TRAINING PROGRAM
Payer: COMMERCIAL

## 2023-10-03 ENCOUNTER — APPOINTMENT (OUTPATIENT)
Dept: CT IMAGING | Facility: HOSPITAL | Age: 53
End: 2023-10-03
Attending: STUDENT IN AN ORGANIZED HEALTH CARE EDUCATION/TRAINING PROGRAM
Payer: COMMERCIAL

## 2023-10-03 LAB
ANION GAP SERPL CALC-SCNC: 5 MMOL/L (ref 0–18)
APTT PPP: 37.5 SECONDS (ref 23.3–35.6)
BASOPHILS # BLD AUTO: 0 X10(3) UL (ref 0–0.2)
BASOPHILS NFR BLD AUTO: 0 %
BUN BLD-MCNC: 7 MG/DL (ref 7–18)
CALCIUM BLD-MCNC: 7.9 MG/DL (ref 8.5–10.1)
CHLORIDE SERPL-SCNC: 103 MMOL/L (ref 98–112)
CO2 SERPL-SCNC: 27 MMOL/L (ref 21–32)
CREAT BLD-MCNC: 0.67 MG/DL
CREAT BLD-MCNC: 0.67 MG/DL
D DIMER PPP FEU-MCNC: 1.67 UG/ML FEU (ref ?–0.53)
EGFRCR SERPLBLD CKD-EPI 2021: 112 ML/MIN/1.73M2 (ref 60–?)
EGFRCR SERPLBLD CKD-EPI 2021: 112 ML/MIN/1.73M2 (ref 60–?)
EOSINOPHIL # BLD AUTO: 0 X10(3) UL (ref 0–0.7)
EOSINOPHIL NFR BLD AUTO: 0 %
ERYTHROCYTE [DISTWIDTH] IN BLOOD BY AUTOMATED COUNT: 14 %
FIBRINOGEN PPP-MCNC: 647 MG/DL (ref 180–480)
GLUCOSE BLD-MCNC: 121 MG/DL (ref 70–99)
HCT VFR BLD AUTO: 29.7 %
HGB BLD-MCNC: 9.9 G/DL
IMM GRANULOCYTES # BLD AUTO: 0 X10(3) UL (ref 0–1)
IMM GRANULOCYTES NFR BLD: 0 %
INR BLD: 1.25 (ref 0.85–1.16)
LYMPHOCYTES # BLD AUTO: 0.82 X10(3) UL (ref 1–4)
LYMPHOCYTES NFR BLD AUTO: 60.3 %
MCH RBC QN AUTO: 27.3 PG (ref 26–34)
MCHC RBC AUTO-ENTMCNC: 33.3 G/DL (ref 31–37)
MCV RBC AUTO: 82 FL
MONOCYTES # BLD AUTO: 0.4 X10(3) UL (ref 0.1–1)
MONOCYTES NFR BLD AUTO: 29.4 %
NEUTROPHILS # BLD AUTO: 0.14 X10 (3) UL (ref 1.5–7.7)
NEUTROPHILS # BLD AUTO: 0.14 X10(3) UL (ref 1.5–7.7)
NEUTROPHILS NFR BLD AUTO: 10.3 %
OSMOLALITY SERPL CALC.SUM OF ELEC: 279 MOSM/KG (ref 275–295)
PLATELET # BLD AUTO: 90 10(3)UL (ref 150–450)
POTASSIUM SERPL-SCNC: 3.4 MMOL/L (ref 3.5–5.1)
PROTHROMBIN TIME: 15.8 SECONDS (ref 11.6–14.8)
RBC # BLD AUTO: 3.62 X10(6)UL
SODIUM SERPL-SCNC: 135 MMOL/L (ref 136–145)
WBC # BLD AUTO: 1.4 X10(3) UL (ref 4–11)

## 2023-10-03 PROCEDURE — 85730 THROMBOPLASTIN TIME PARTIAL: CPT | Performed by: STUDENT IN AN ORGANIZED HEALTH CARE EDUCATION/TRAINING PROGRAM

## 2023-10-03 PROCEDURE — 71260 CT THORAX DX C+: CPT | Performed by: STUDENT IN AN ORGANIZED HEALTH CARE EDUCATION/TRAINING PROGRAM

## 2023-10-03 PROCEDURE — 85384 FIBRINOGEN ACTIVITY: CPT | Performed by: STUDENT IN AN ORGANIZED HEALTH CARE EDUCATION/TRAINING PROGRAM

## 2023-10-03 PROCEDURE — 85610 PROTHROMBIN TIME: CPT | Performed by: STUDENT IN AN ORGANIZED HEALTH CARE EDUCATION/TRAINING PROGRAM

## 2023-10-03 PROCEDURE — 85025 COMPLETE CBC W/AUTO DIFF WBC: CPT | Performed by: STUDENT IN AN ORGANIZED HEALTH CARE EDUCATION/TRAINING PROGRAM

## 2023-10-03 PROCEDURE — 71045 X-RAY EXAM CHEST 1 VIEW: CPT | Performed by: STUDENT IN AN ORGANIZED HEALTH CARE EDUCATION/TRAINING PROGRAM

## 2023-10-03 PROCEDURE — 85379 FIBRIN DEGRADATION QUANT: CPT | Performed by: STUDENT IN AN ORGANIZED HEALTH CARE EDUCATION/TRAINING PROGRAM

## 2023-10-03 PROCEDURE — 87040 BLOOD CULTURE FOR BACTERIA: CPT | Performed by: STUDENT IN AN ORGANIZED HEALTH CARE EDUCATION/TRAINING PROGRAM

## 2023-10-03 PROCEDURE — 80048 BASIC METABOLIC PNL TOTAL CA: CPT | Performed by: STUDENT IN AN ORGANIZED HEALTH CARE EDUCATION/TRAINING PROGRAM

## 2023-10-03 PROCEDURE — 97760 ORTHOTIC MGMT&TRAING 1ST ENC: CPT

## 2023-10-03 PROCEDURE — 82565 ASSAY OF CREATININE: CPT | Performed by: INTERNAL MEDICINE

## 2023-10-03 RX ORDER — POTASSIUM CHLORIDE 20 MEQ/1
40 TABLET, EXTENDED RELEASE ORAL ONCE
Status: COMPLETED | OUTPATIENT
Start: 2023-10-03 | End: 2023-10-03

## 2023-10-03 RX ORDER — IPRATROPIUM BROMIDE AND ALBUTEROL SULFATE 2.5; .5 MG/3ML; MG/3ML
3 SOLUTION RESPIRATORY (INHALATION) EVERY 6 HOURS PRN
Status: DISCONTINUED | OUTPATIENT
Start: 2023-10-03 | End: 2023-10-12

## 2023-10-03 NOTE — PROGRESS NOTES
Pt states feels better, HR low 100's, afebrile, RR 20. SPO2 90-91% on 4l/nc, no SOB or CP. Will cont to monitor.

## 2023-10-03 NOTE — OCCUPATIONAL THERAPY NOTE
OT ADULT SPLINT/SLING TREATMENT - INPATIENT    Room Number: 384/384-A  Evaluation Date: 10/3/2023     Presenting Problem: cat bite  Orders: fashion a custom volar  wrist splint to be worn for soft tissue rest to protect wound, do not block MCPs and allow for free finger motion        SUBJECTIVE  Pt stated, \"I can try to wear it more. \"    Onset Date: 9/28/2023    Mechanism of Injury:   Cat Bite  Surgery: Yes  Type of Splint/Sling Requested: fashion a custom volar  wrist splint to be worn for soft tissue rest to protect wound, do not block MCPs and allow for free finger motion    Related Medical History: I&D of LUE on 10/1/2023  Medications: pain medications and antibiotics  Psychosocial: NA    Hand Dominance: right  Functional Limitations Reported: none  Prior Level of Functioning: Independent  Patient Stated Goals: to move hand more    OBJECTIVE  OT modified splint 2/2 slightly decreased swelling, pt able to have split tightened around ulnar aspect of wrist but stayed flared on radial aspect, pt able to very slightly extend wrist more than previous, splint modified to more extension, closer to a neutral protective position. Splint donned with straps this session. Splint/Sling fabricated/dispensed: Volar hand splint as ordered by MD was modified    Education and Instruction  Patient splint/sling wear schedule: 24/7  Patient/staff instructed in precautions for splint/sling including excessive swelling, pain, pressure irritation, stiffness, and change in color. Patient to notify therapist if the above occur.   Further precautions: 4 time daily warm water soaks, then dress with bacitracin, adaptic and soft dressing   Monitor cellulitis, gentle short arc wrist range of motion and finger motion 5 times daily, encourage elevation  Patient/staff instructed to notify therapist if adjustments need to be made,  Patient/staff instructed in cleaning: clean splint using soap and lukewarm water, remove spots with ink or spot cleanser with chlorine, clean with alcohol swabs. Written instructions left/posted for patient/staff    ASSESSMENT  Pt with extreme swelling and limited ROM that limit ability to have splint fully fabricated, will need to continue to modify splint as this improves. Splint is closer to neutral and swelling has decreased slightly. Splint/sling fits well and patient/staff demonstrates retention of instructions and care plan.   Rehab Facilitators: motivated, family support, intact cognition, and high PLF  Rehab Barriers: none    PLAN  OT will follow to modify splint as needed    Evaluation Charged  No   Treatment Charge  30   Total Time with Patient (min)  30     Reviewed By GATITO Fitzpatrick, OTR/L

## 2023-10-03 NOTE — PROGRESS NOTES
Pt noted with tachypnea, T-100.0, /69, O2 at 4l/nc, ST on tele monitoring. Denies CP or SOB, lung sounds clear, only c/o is nausea. Zofran given. Lt arm A/W dsg cdi, pitting edema present to fingers, hand and lower arm. Arm kept elevated. Encouraged use of IS, coughing and deep breathing. Tolerating IV abx. Cultures pending. Cont plan of care.

## 2023-10-03 NOTE — CM/SW NOTE
10/03/23 1600   CM/SW Referral Data   Referral Source Social Work (self-referral)   Reason for Referral Discharge planning   Informant Patient;EMR;Clinical Staff Member   Patient Info   Patient's Current Mental Status at Time of Assessment Alert;Oriented   Patient's [address]   Number of Levels in Home 1   Patient lives with Parent(s)   Patient Status Prior to Admission   Independent with ADLs and Mobility Yes   Discharge Needs   Anticipated D/C needs Infusion care         Patient is a 49 y/o man admitted with cat bite now s/p I&D. Noted vascular team consulted for PICC placement. No current ID note indicating ID plan. Referral sent to [facility] via [employer] to determine pt's insurance coverage for infusion services. Met with pt to discuss DC planning. Pt lives with his mother in a ranch style home. 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 Pt is normally independent with ADLs. Discussed anticipated need for IV abx at discharge. Reviewed options for home infusion with [facility] or outpatient infusion at infusion center. Pt with cat bite to his left hand and stated the hand is stiff. He does not feel he would be able to self administer IV abx. Pt's mother is elderly and he does not feel she has the dexterity to assist.  Pt has children, but none living locally. At this time, pt indicating preference for outpatient infusion center. Discussed need to confirm pt would be able to discharge on once daily IV abx. Pt will also need to be taught dressing changes. Await MD recommendations for further DC planning. / to remain available for support and/or discharge planning.      Loraine Chacon [facility]  Discharge Planner  [phone]

## 2023-10-03 NOTE — PLAN OF CARE
Post-op day 1 I&D. Dressing clean, dry, and intact. Patient alert and oriented x4. Room air. Continuous pulse oximeter. Telemetry monitoring. Non-cardiac electrolyte protocol. Lovenox for anticoagulation. Volar splint fitted by therapy. Daily soaks every 6 hours. Cultures pending. Vanco every 12 hours, Rocephin every 24 hours, and flagyl every 8 hours. One episode of emesis in evening. Stand by assist. IV fluids. Plan is home when medically stable.

## 2023-10-03 NOTE — PLAN OF CARE
POD 2 I&D left arm/hand. Medications given as ordered. Patient remains on 3-4 L oxygen via NC. Pain controlled with PRN Norco and wound care as ordered. Patient to have CT and possible PICC line placed. Plan to possibly discharge home on IV antibiotics.

## 2023-10-04 LAB
ALBUMIN SERPL-MCNC: 2.3 G/DL (ref 3.4–5)
ALBUMIN/GLOB SERPL: 0.6 {RATIO} (ref 1–2)
ALP LIVER SERPL-CCNC: 83 U/L
ALT SERPL-CCNC: 49 U/L
ANION GAP SERPL CALC-SCNC: 3 MMOL/L (ref 0–18)
AST SERPL-CCNC: 46 U/L (ref 15–37)
BASOPHILS # BLD AUTO: 0 X10(3) UL (ref 0–0.2)
BASOPHILS NFR BLD AUTO: 0 %
BILIRUB SERPL-MCNC: 0.4 MG/DL (ref 0.1–2)
BUN BLD-MCNC: 7 MG/DL (ref 7–18)
CALCIUM BLD-MCNC: 8.1 MG/DL (ref 8.5–10.1)
CHLORIDE SERPL-SCNC: 103 MMOL/L (ref 98–112)
CO2 SERPL-SCNC: 29 MMOL/L (ref 21–32)
CREAT BLD-MCNC: 0.95 MG/DL
EGFRCR SERPLBLD CKD-EPI 2021: 96 ML/MIN/1.73M2 (ref 60–?)
EOSINOPHIL # BLD AUTO: 0 X10(3) UL (ref 0–0.7)
EOSINOPHIL NFR BLD AUTO: 0 %
ERYTHROCYTE [DISTWIDTH] IN BLOOD BY AUTOMATED COUNT: 14.1 %
GLOBULIN PLAS-MCNC: 4 G/DL (ref 2.8–4.4)
GLUCOSE BLD-MCNC: 112 MG/DL (ref 70–99)
HCT VFR BLD AUTO: 29.6 %
HGB BLD-MCNC: 9.9 G/DL
IMM GRANULOCYTES # BLD AUTO: 0.01 X10(3) UL (ref 0–1)
IMM GRANULOCYTES NFR BLD: 0.9 %
LYMPHOCYTES # BLD AUTO: 0.68 X10(3) UL (ref 1–4)
LYMPHOCYTES NFR BLD AUTO: 58.1 %
MCH RBC QN AUTO: 27 PG (ref 26–34)
MCHC RBC AUTO-ENTMCNC: 33.4 G/DL (ref 31–37)
MCV RBC AUTO: 80.7 FL
MONOCYTES # BLD AUTO: 0.35 X10(3) UL (ref 0.1–1)
MONOCYTES NFR BLD AUTO: 29.9 %
NEUTROPHILS # BLD AUTO: 0.13 X10 (3) UL (ref 1.5–7.7)
NEUTROPHILS # BLD AUTO: 0.13 X10(3) UL (ref 1.5–7.7)
NEUTROPHILS NFR BLD AUTO: 11.1 %
OSMOLALITY SERPL CALC.SUM OF ELEC: 279 MOSM/KG (ref 275–295)
PLATELET # BLD AUTO: 109 10(3)UL (ref 150–450)
POTASSIUM SERPL-SCNC: 3.4 MMOL/L (ref 3.5–5.1)
POTASSIUM SERPL-SCNC: 3.4 MMOL/L (ref 3.5–5.1)
PROT SERPL-MCNC: 6.3 G/DL (ref 6.4–8.2)
RBC # BLD AUTO: 3.67 X10(6)UL
SODIUM SERPL-SCNC: 135 MMOL/L (ref 136–145)
WBC # BLD AUTO: 1.2 X10(3) UL (ref 4–11)

## 2023-10-04 PROCEDURE — 85025 COMPLETE CBC W/AUTO DIFF WBC: CPT | Performed by: STUDENT IN AN ORGANIZED HEALTH CARE EDUCATION/TRAINING PROGRAM

## 2023-10-04 PROCEDURE — 80053 COMPREHEN METABOLIC PANEL: CPT | Performed by: INTERNAL MEDICINE

## 2023-10-04 PROCEDURE — 84132 ASSAY OF SERUM POTASSIUM: CPT | Performed by: INTERNAL MEDICINE

## 2023-10-04 PROCEDURE — 94640 AIRWAY INHALATION TREATMENT: CPT

## 2023-10-04 RX ORDER — POTASSIUM CHLORIDE 20 MEQ/1
40 TABLET, EXTENDED RELEASE ORAL ONCE
Status: COMPLETED | OUTPATIENT
Start: 2023-10-04 | End: 2023-10-04

## 2023-10-04 RX ORDER — VANCOMYCIN HYDROCHLORIDE
1250 EVERY 12 HOURS
Status: DISCONTINUED | OUTPATIENT
Start: 2023-10-04 | End: 2023-10-06

## 2023-10-04 NOTE — PLAN OF CARE
POD 3 I&D left arm/hand. Medications given as ordered. Patient remains on 3-4 L oxygen via NC. Pain controlled with PRN Norco and wound care as ordered. Patient refused PICC placement at this time. Plan to possibly discharge home on IV antibiotics when medically ready.

## 2023-10-04 NOTE — PLAN OF CARE
Wound reviewed    There is progression of erythema around cat bite wound but no active drainage. There is a central area of necrosis at the cat bite site that is consolidating     I discussed this case with Dr Alea Rahman due to the spreading erythema    Plan is to open one suture next to eschar and continue soaks today and antibiotics    NPO at [address]    Dr Alea Rahman will examine tomorrow morning to determine if a repeat I&D is required    Recommend continued wound monitoring over next few days    Ingrid Alegria MD  [address] [facility] Certified Orthopedic Surgeon  Hand and Upper Extremity Specialist  [address] [facility]  [email or web address]

## 2023-10-04 NOTE — OCCUPATIONAL THERAPY NOTE
OT saw pt for splint check, splint worn over dressing and appears to be fitting securely and as approp, pt with same swelling as yesterday for splint fitting and with no further ROM of wrist to modify splint at this time. Pt continues with flare on radial side of splint for incision protection, pt wearing splint with straps at this time. OT will continue to follow for splint fit check.

## 2023-10-04 NOTE — CONSULTS
[facility]  Report of Inpatient Wound Care Consultation    Howell Neighbor Patient Status:  Inpatient    3/9/1970 MRN [id number]   [facility] 3SW-A Attending Jenny Saleem, [address] [facility] Day # 4 PCP Nelida Peña MD     Reason for Consultation:  L wrist surgical wound    History of Present Illness:  Lynda Zurita is a a(n) 48year old male. Patient with multiple comorbidities, with present on admission skin breakdown described below. SUBJECTIVE:  Noted surgical incision from cat bite. Ortho following      History:  History reviewed. No pertinent past medical history. Past Surgical History:   Procedure Laterality Date    APPENDECTOMY      INGUINAL HERNIA REPAIR Bilateral     ORBITAL PROSTHESIS Left     [address]      reports that he has been smoking. He has been smoking an average of 1 pack per day. He has never used smokeless tobacco. He reports that he does not drink alcohol and does not use drugs. Allergies:  @ALLERGY    Laboratory Data:    Recent Labs   Lab 23  1231 10/01/23  0541 10/02/23  0429 10/02/23  1012 10/03/23  0503 10/04/23  0512   WBC 1.6* 1.7* 1.3*  --  1.4* 1.2*   HGB 12.8* 11.2* 10.6*  --  9.9* 9.9*   HCT 36.6* 32.4* 30.8*  --  29.7* 29.6*   .0* 99.0* 87.0*  --  90.0* 109.0*   CREATSERUM 0.85 0.89 0.74  0.74  --  0.67*  0.67* 0.95   BUN 12 10 10  --  7 7   * 117* 114*  --  121* 112*   CA 8.2* 8.0* 7.8*  --  7.9* 8.1*   ALB 3.1* 2.7*  --   --   --  2.3*   TP 7.9 6.7  --   --   --  6.3*   PTT 37.8*  --   --  29.0 37.5*  --    INR 1.29* 1.28*  --  1.22* 1.25*  --    DDIMER 1.24* 1.46*  --  2.39* 1.67*  --          ASSESSMENT:  Wound Incision Arm Left (Active)   No Date First Assessed or Time First Assessed found. Primary Wound Type:  Incision  Location: Arm  Wound Location Orientation: Left      Assessments 10/4/2023  9:44 AM   Wound Image                        L dorsal wrist with incision and staples and eschar noted laterally, dry and stable. Warm to the touch, tender. Area of eschar centrally measures at (3.7cm x 2.5cm x 0) with 100% black eschar, slight drainage noted. Erythemia around the area and proximally towards elbow noted. RN aware and Ortho following. Wound Cleaning and Dressings:  Showering directions: May shower and/or cleanse wound with mild soap and water  Wound cleansing:  Cleanse with normal saline or wound cleanser  Wound cleaning frequency: Daily  Wound product: Honey gel, Thick foam, Dry gauze, ABD pad, and Kerlix  Dressing change frequency:  Change dressing daily and/or PRN  Enzymatic agent:  Honey    Additional Notes:  Ortho following, RN stated they will be examining the area as well to develop a more definitive plan of care. Planned OR with Ortho, will need new order to see post surgical, RN aware. Thank you for this consultation and for allowing me to participate in the care of your patient. Please page me at #[phone] if you have any questions about this consultation and plan of care. Time Spent 45 Minutes. Thank you,  Darcy Arizmendi.  Lenny Adrian, PT, MPT  Wound Care Clinician  [address] [facility] Wound Care Team  10/4/2023

## 2023-10-04 NOTE — PROGRESS NOTES
Related plan with patient and wife    They understand long term abx is important in eradicating this infection.  They understand repeat Debridement may be needed over the next week and that soft tissue healing may be slow with wound care needs over the next month

## 2023-10-04 NOTE — PLAN OF CARE
AxO4. VSS on 3-4L. On tele-ST. PRN norco for pain control. Given PRN zofran - c/o of nausea when given PO flagyl. On IV rocephin. Dressing on L hand C/D/I. Soaked w/ warm, soapy water for 15 min. Cast on L hand maintained. Poss PICC line placement for outpt abx therapy. Updated on POC. Safety measures placed. Care ongoing.

## 2023-10-04 NOTE — VASCULAR ACCESS
VAT consulted for PICC placement. Pt would like to discuss further with ID prior to placement. Pt declining PICC placement at this time. Percy Ford RN updated.

## 2023-10-05 ENCOUNTER — ANESTHESIA (OUTPATIENT)
Dept: SURGERY | Facility: HOSPITAL | Age: 53
End: 2023-10-05
Payer: COMMERCIAL

## 2023-10-05 ENCOUNTER — ANESTHESIA EVENT (OUTPATIENT)
Dept: SURGERY | Facility: HOSPITAL | Age: 53
End: 2023-10-05
Payer: COMMERCIAL

## 2023-10-05 LAB
ANION GAP SERPL CALC-SCNC: 5 MMOL/L (ref 0–18)
BASOPHILS # BLD AUTO: 0 X10(3) UL (ref 0–0.2)
BASOPHILS NFR BLD AUTO: 0 %
BUN BLD-MCNC: 8 MG/DL (ref 7–18)
CALCIUM BLD-MCNC: 8.3 MG/DL (ref 8.5–10.1)
CHLORIDE SERPL-SCNC: 102 MMOL/L (ref 98–112)
CO2 SERPL-SCNC: 27 MMOL/L (ref 21–32)
CREAT BLD-MCNC: 0.72 MG/DL
EGFRCR SERPLBLD CKD-EPI 2021: 109 ML/MIN/1.73M2 (ref 60–?)
EOSINOPHIL # BLD AUTO: 0 X10(3) UL (ref 0–0.7)
EOSINOPHIL NFR BLD AUTO: 0 %
ERYTHROCYTE [DISTWIDTH] IN BLOOD BY AUTOMATED COUNT: 14.1 %
GLUCOSE BLD-MCNC: 112 MG/DL (ref 70–99)
HCT VFR BLD AUTO: 30.7 %
HGB BLD-MCNC: 10.4 G/DL
IMM GRANULOCYTES # BLD AUTO: 0 X10(3) UL (ref 0–1)
IMM GRANULOCYTES NFR BLD: 0 %
LYMPHOCYTES # BLD AUTO: 0.55 X10(3) UL (ref 1–4)
LYMPHOCYTES NFR BLD AUTO: 59.1 %
MCH RBC QN AUTO: 27.4 PG (ref 26–34)
MCHC RBC AUTO-ENTMCNC: 33.9 G/DL (ref 31–37)
MCV RBC AUTO: 80.8 FL
MONOCYTES # BLD AUTO: 0.2 X10(3) UL (ref 0.1–1)
MONOCYTES NFR BLD AUTO: 21.5 %
NEUTROPHILS # BLD AUTO: 0.18 X10 (3) UL (ref 1.5–7.7)
NEUTROPHILS # BLD AUTO: 0.18 X10(3) UL (ref 1.5–7.7)
NEUTROPHILS NFR BLD AUTO: 19.4 %
OSMOLALITY SERPL CALC.SUM OF ELEC: 277 MOSM/KG (ref 275–295)
PLATELET # BLD AUTO: 96 10(3)UL (ref 150–450)
POTASSIUM SERPL-SCNC: 3.6 MMOL/L (ref 3.5–5.1)
POTASSIUM SERPL-SCNC: 3.6 MMOL/L (ref 3.5–5.1)
RBC # BLD AUTO: 3.8 X10(6)UL
SODIUM SERPL-SCNC: 134 MMOL/L (ref 136–145)
WBC # BLD AUTO: 0.9 X10(3) UL (ref 4–11)

## 2023-10-05 PROCEDURE — 87206 SMEAR FLUORESCENT/ACID STAI: CPT | Performed by: ORTHOPAEDIC SURGERY

## 2023-10-05 PROCEDURE — 87116 MYCOBACTERIA CULTURE: CPT | Performed by: ORTHOPAEDIC SURGERY

## 2023-10-05 PROCEDURE — 85025 COMPLETE CBC W/AUTO DIFF WBC: CPT | Performed by: STUDENT IN AN ORGANIZED HEALTH CARE EDUCATION/TRAINING PROGRAM

## 2023-10-05 PROCEDURE — 87176 TISSUE HOMOGENIZATION CULTR: CPT | Performed by: ORTHOPAEDIC SURGERY

## 2023-10-05 PROCEDURE — 0JDK0ZZ EXTRACTION OF LEFT HAND SUBCUTANEOUS TISSUE AND FASCIA, OPEN APPROACH: ICD-10-PCS | Performed by: ORTHOPAEDIC SURGERY

## 2023-10-05 PROCEDURE — 87102 FUNGUS ISOLATION CULTURE: CPT | Performed by: ORTHOPAEDIC SURGERY

## 2023-10-05 PROCEDURE — 84132 ASSAY OF SERUM POTASSIUM: CPT | Performed by: STUDENT IN AN ORGANIZED HEALTH CARE EDUCATION/TRAINING PROGRAM

## 2023-10-05 PROCEDURE — 87075 CULTR BACTERIA EXCEPT BLOOD: CPT | Performed by: ORTHOPAEDIC SURGERY

## 2023-10-05 PROCEDURE — 80048 BASIC METABOLIC PNL TOTAL CA: CPT | Performed by: STUDENT IN AN ORGANIZED HEALTH CARE EDUCATION/TRAINING PROGRAM

## 2023-10-05 PROCEDURE — 87070 CULTURE OTHR SPECIMN AEROBIC: CPT | Performed by: ORTHOPAEDIC SURGERY

## 2023-10-05 PROCEDURE — 87205 SMEAR GRAM STAIN: CPT | Performed by: ORTHOPAEDIC SURGERY

## 2023-10-05 RX ORDER — ONDANSETRON 2 MG/ML
INJECTION INTRAMUSCULAR; INTRAVENOUS AS NEEDED
Status: DISCONTINUED | OUTPATIENT
Start: 2023-10-05 | End: 2023-10-05 | Stop reason: SURG

## 2023-10-05 RX ORDER — HYDROMORPHONE HYDROCHLORIDE 1 MG/ML
0.6 INJECTION, SOLUTION INTRAMUSCULAR; INTRAVENOUS; SUBCUTANEOUS EVERY 5 MIN PRN
Status: DISCONTINUED | OUTPATIENT
Start: 2023-10-05 | End: 2023-10-05 | Stop reason: HOSPADM

## 2023-10-05 RX ORDER — HYDROMORPHONE HYDROCHLORIDE 1 MG/ML
0.4 INJECTION, SOLUTION INTRAMUSCULAR; INTRAVENOUS; SUBCUTANEOUS EVERY 5 MIN PRN
Status: DISCONTINUED | OUTPATIENT
Start: 2023-10-05 | End: 2023-10-05 | Stop reason: HOSPADM

## 2023-10-05 RX ORDER — PROCHLORPERAZINE EDISYLATE 5 MG/ML
5 INJECTION INTRAMUSCULAR; INTRAVENOUS EVERY 8 HOURS PRN
Status: DISCONTINUED | OUTPATIENT
Start: 2023-10-05 | End: 2023-10-05 | Stop reason: HOSPADM

## 2023-10-05 RX ORDER — ACETAMINOPHEN 10 MG/ML
INJECTION, SOLUTION INTRAVENOUS AS NEEDED
Status: DISCONTINUED | OUTPATIENT
Start: 2023-10-05 | End: 2023-10-05 | Stop reason: SURG

## 2023-10-05 RX ORDER — MIDAZOLAM HYDROCHLORIDE 1 MG/ML
INJECTION INTRAMUSCULAR; INTRAVENOUS AS NEEDED
Status: DISCONTINUED | OUTPATIENT
Start: 2023-10-05 | End: 2023-10-05 | Stop reason: SURG

## 2023-10-05 RX ORDER — LABETALOL HYDROCHLORIDE 5 MG/ML
5 INJECTION, SOLUTION INTRAVENOUS EVERY 5 MIN PRN
Status: DISCONTINUED | OUTPATIENT
Start: 2023-10-05 | End: 2023-10-05 | Stop reason: HOSPADM

## 2023-10-05 RX ORDER — MEPERIDINE HYDROCHLORIDE 25 MG/ML
12.5 INJECTION INTRAMUSCULAR; INTRAVENOUS; SUBCUTANEOUS AS NEEDED
Status: DISCONTINUED | OUTPATIENT
Start: 2023-10-05 | End: 2023-10-05 | Stop reason: HOSPADM

## 2023-10-05 RX ORDER — SODIUM CHLORIDE, SODIUM LACTATE, POTASSIUM CHLORIDE, CALCIUM CHLORIDE 600; 310; 30; 20 MG/100ML; MG/100ML; MG/100ML; MG/100ML
INJECTION, SOLUTION INTRAVENOUS CONTINUOUS
Status: DISCONTINUED | OUTPATIENT
Start: 2023-10-05 | End: 2023-10-05 | Stop reason: HOSPADM

## 2023-10-05 RX ORDER — HYDROMORPHONE HYDROCHLORIDE 1 MG/ML
0.2 INJECTION, SOLUTION INTRAMUSCULAR; INTRAVENOUS; SUBCUTANEOUS EVERY 5 MIN PRN
Status: DISCONTINUED | OUTPATIENT
Start: 2023-10-05 | End: 2023-10-05 | Stop reason: HOSPADM

## 2023-10-05 RX ORDER — HYDROCODONE BITARTRATE AND ACETAMINOPHEN 5; 325 MG/1; MG/1
1 TABLET ORAL ONCE AS NEEDED
Status: DISCONTINUED | OUTPATIENT
Start: 2023-10-05 | End: 2023-10-05 | Stop reason: HOSPADM

## 2023-10-05 RX ORDER — SODIUM CHLORIDE 9 MG/ML
INJECTION, SOLUTION INTRAVENOUS CONTINUOUS
Status: DISCONTINUED | OUTPATIENT
Start: 2023-10-05 | End: 2023-10-05 | Stop reason: HOSPADM

## 2023-10-05 RX ORDER — NALOXONE HYDROCHLORIDE 0.4 MG/ML
80 INJECTION, SOLUTION INTRAMUSCULAR; INTRAVENOUS; SUBCUTANEOUS AS NEEDED
Status: DISCONTINUED | OUTPATIENT
Start: 2023-10-05 | End: 2023-10-05 | Stop reason: HOSPADM

## 2023-10-05 RX ORDER — MIDAZOLAM HYDROCHLORIDE 1 MG/ML
1 INJECTION INTRAMUSCULAR; INTRAVENOUS EVERY 5 MIN PRN
Status: DISCONTINUED | OUTPATIENT
Start: 2023-10-05 | End: 2023-10-05 | Stop reason: HOSPADM

## 2023-10-05 RX ORDER — MELATONIN
3 NIGHTLY PRN
Status: DISCONTINUED | OUTPATIENT
Start: 2023-10-05 | End: 2023-10-12

## 2023-10-05 RX ORDER — CEFAZOLIN SODIUM/WATER 2 G/20 ML
SYRINGE (ML) INTRAVENOUS AS NEEDED
Status: DISCONTINUED | OUTPATIENT
Start: 2023-10-05 | End: 2023-10-05 | Stop reason: SURG

## 2023-10-05 RX ORDER — ACETAMINOPHEN 500 MG
1000 TABLET ORAL ONCE AS NEEDED
Status: DISCONTINUED | OUTPATIENT
Start: 2023-10-05 | End: 2023-10-05 | Stop reason: HOSPADM

## 2023-10-05 RX ORDER — HYDROCODONE BITARTRATE AND ACETAMINOPHEN 5; 325 MG/1; MG/1
2 TABLET ORAL ONCE AS NEEDED
Status: DISCONTINUED | OUTPATIENT
Start: 2023-10-05 | End: 2023-10-05 | Stop reason: HOSPADM

## 2023-10-05 RX ORDER — ONDANSETRON 2 MG/ML
4 INJECTION INTRAMUSCULAR; INTRAVENOUS EVERY 6 HOURS PRN
Status: DISCONTINUED | OUTPATIENT
Start: 2023-10-05 | End: 2023-10-05 | Stop reason: HOSPADM

## 2023-10-05 RX ORDER — DEXAMETHASONE SODIUM PHOSPHATE 4 MG/ML
VIAL (ML) INJECTION AS NEEDED
Status: DISCONTINUED | OUTPATIENT
Start: 2023-10-05 | End: 2023-10-05 | Stop reason: SURG

## 2023-10-05 RX ORDER — LIDOCAINE HYDROCHLORIDE 10 MG/ML
INJECTION, SOLUTION EPIDURAL; INFILTRATION; INTRACAUDAL; PERINEURAL AS NEEDED
Status: DISCONTINUED | OUTPATIENT
Start: 2023-10-05 | End: 2023-10-05 | Stop reason: SURG

## 2023-10-05 RX ORDER — ENOXAPARIN SODIUM 100 MG/ML
40 INJECTION SUBCUTANEOUS DAILY
Status: DISCONTINUED | OUTPATIENT
Start: 2023-10-06 | End: 2023-10-12

## 2023-10-05 RX ORDER — SODIUM CHLORIDE, SODIUM LACTATE, POTASSIUM CHLORIDE, CALCIUM CHLORIDE 600; 310; 30; 20 MG/100ML; MG/100ML; MG/100ML; MG/100ML
INJECTION, SOLUTION INTRAVENOUS CONTINUOUS PRN
Status: DISCONTINUED | OUTPATIENT
Start: 2023-10-05 | End: 2023-10-05 | Stop reason: SURG

## 2023-10-05 RX ADMIN — LIDOCAINE HYDROCHLORIDE 50 MG: 10 INJECTION, SOLUTION EPIDURAL; INFILTRATION; INTRACAUDAL; PERINEURAL at 12:22:00

## 2023-10-05 RX ADMIN — ACETAMINOPHEN 1000 MG: 10 INJECTION, SOLUTION INTRAVENOUS at 12:47:00

## 2023-10-05 RX ADMIN — ONDANSETRON 4 MG: 2 INJECTION INTRAMUSCULAR; INTRAVENOUS at 12:49:00

## 2023-10-05 RX ADMIN — SODIUM CHLORIDE, SODIUM LACTATE, POTASSIUM CHLORIDE, CALCIUM CHLORIDE: 600; 310; 30; 20 INJECTION, SOLUTION INTRAVENOUS at 12:51:00

## 2023-10-05 RX ADMIN — MIDAZOLAM HYDROCHLORIDE 2 MG: 1 INJECTION INTRAMUSCULAR; INTRAVENOUS at 12:17:00

## 2023-10-05 RX ADMIN — SODIUM CHLORIDE, SODIUM LACTATE, POTASSIUM CHLORIDE, CALCIUM CHLORIDE: 600; 310; 30; 20 INJECTION, SOLUTION INTRAVENOUS at 12:22:00

## 2023-10-05 RX ADMIN — CEFAZOLIN SODIUM/WATER 2 G: 2 G/20 ML SYRINGE (ML) INTRAVENOUS at 12:27:00

## 2023-10-05 RX ADMIN — DEXAMETHASONE SODIUM PHOSPHATE 4 MG: 4 MG/ML VIAL (ML) INJECTION at 12:27:00

## 2023-10-05 NOTE — PLAN OF CARE
1950: Patient's temp measured at 100.5 (given PRN tylenol), HR 120s, /77. Surgical site assessed - notice worsening redness and white slough on site. Patient states that he feels \"crappy\" and that the surgical site \"definitely looks worse from yesterday. \" Hospitalist, Ortho, and Infectious Disease paged. Hospitalist - defers intervention to ortho and ID, updated on consults' replies    Ortho - no new orders, continue to monitor temps and surgical site, keep extremity elevated, inform day shift team of updates    ID - start patient on IV vanco and zosyn, pharmacy consulted for dosing. IV rocephin discontinued. AxO4. On 4L NC. PRN neb treatments available. Can be tachy. See MAR for PRN pain meds available. Denies nausea, voids w/o issue. Dressing on L forearm intact. Splint applied. Elevated on pillows. On IV rocephin and PO flagyl. Started on IV vanco and IV zosyn. Family @ bedside. Updated on POC. Safety measures placed. Care ongoing.

## 2023-10-05 NOTE — VASCULAR ACCESS
Consulted to place a PICC line by DR Cricket Burks. Pt declined PICC line 10/3. Per RN, Leonel Rodrigeuz, pt has a scheduled I& D today and VAT will not be able to place PICC today. Is RN asked Leonel Rodriguez to call when pt is agreeable to have his PICC line placed.

## 2023-10-05 NOTE — ANESTHESIA POSTPROCEDURE EVALUATION
[facility] Patient Status:  Inpatient   Age/Gender 48year old male MRN [id number]   Location 1310 [facility] Attending Kimberly Paddy, 1604 Formerly named [facility] Day # 5 PCP Alli Lombardi MD       Anesthesia Post-op Note    INCISION AND DRAINAGE LEFT WRIST    Procedure Summary       Date: 10/05/23 Room / Location: [facility] MAIN OR 10 / [facility] MAIN OR    Anesthesia Start: 1214 Anesthesia Stop:     Procedure: INCISION AND DRAINAGE LEFT WRIST (Left: Lower Arm) Diagnosis:       Cellulitis      (Cellulitis [L03.90])    Surgeons: Tawana Elkins MD Anesthesiologist: Karen Arguelles MD    Anesthesia Type: general ASA Status: 2            Anesthesia Type: general    Vitals Value Taken Time   /85 10/05/23 1303   Temp 97 10/05/23 1305   Pulse 108 10/05/23 1304   Resp 20 10/05/23 1304   SpO2 97 % 10/05/23 1304   Vitals shown include unfiled device data. Patient Location: PACU    Anesthesia Type: general    Airway Patency: patent and extubated    Postop Pain Control: adequate    Mental Status: mildly sedated but able to meaningfully participate in the post-anesthesia evaluation    Nausea/Vomiting: none    Cardiopulmonary/Hydration status: stable euvolemic    Complications: no apparent anesthesia related complications    Postop vital signs: stable    Dental Exam: Unchanged from Preop    Patient to be discharged from PACU when criteria met.

## 2023-10-05 NOTE — PROGRESS NOTES
This RN spoke with surgery holding staff regarding taking patient to surgery. Informed surgery staff that patient is ready but the 1100 AM dose of IV Vancomycin was missing from fridge and that I was in contact with pharmacy to get a replacement dose. Surgery staff said that they had Vancomycin available to give but if patient's Vancomycin dose arrived to unit then please tube it to their station. Vancomycin sent to surgery holding tube station [phone].

## 2023-10-05 NOTE — ANESTHESIA PROCEDURE NOTES
Airway  Date/Time: 10/5/2023 12:23 PM  Urgency: elective      General Information and Staff    Patient location during procedure: OR  Anesthesiologist: Sarah Vega MD  Performed: anesthesiologist   Performed by: Sarah Vega MD  Authorized by: Sarah Vega MD      Indications and Patient Condition  Indications for airway management: anesthesia  Sedation level: deep  Preoxygenated: yes  Patient position: sniffing  Mask difficulty assessment: 0 - not attempted    Final Airway Details  Final airway type: supraglottic airway      Successful airway: classic  Size 5       Number of attempts at approach: 1

## 2023-10-05 NOTE — PLAN OF CARE
Patient POD 0 2nd I&D left arm. Medications given as ordered. Patient remains on 4 L oxygen via NC. Patient ambulated with standby assistance. Patient and family updated on plan of care. Patient resting comfortably in bed with call light within reach.

## 2023-10-05 NOTE — BRIEF OP NOTE
Pre-Operative Diagnosis: Cellulitis [L03.90]     Post-Operative Diagnosis: Cellulitis [L03.90]      Procedure Performed:   INCISION AND DRAINAGE LEFT WRIST    Surgeon(s) and Role:     Lizeth Guzman MD - Primary    Assistant(s):        Surgical Findings: Infected skin with necrosis     Specimen: Area of skin 3 x 7 cm     Estimated Blood Loss: Blood Output: 10 mL (10/5/2023 12:48 PM)      Dictation Number:.     Elle Lambert MD  10/5/2023  1:12 PM

## 2023-10-06 LAB
ANION GAP SERPL CALC-SCNC: 5 MMOL/L (ref 0–18)
BASOPHILS # BLD AUTO: 0.01 X10(3) UL (ref 0–0.2)
BASOPHILS NFR BLD AUTO: 0.9 %
BUN BLD-MCNC: 10 MG/DL (ref 7–18)
CALCIUM BLD-MCNC: 8 MG/DL (ref 8.5–10.1)
CHLORIDE SERPL-SCNC: 104 MMOL/L (ref 98–112)
CO2 SERPL-SCNC: 29 MMOL/L (ref 21–32)
CREAT BLD-MCNC: 0.81 MG/DL
EGFRCR SERPLBLD CKD-EPI 2021: 105 ML/MIN/1.73M2 (ref 60–?)
EOSINOPHIL # BLD AUTO: 0 X10(3) UL (ref 0–0.7)
EOSINOPHIL NFR BLD AUTO: 0 %
ERYTHROCYTE [DISTWIDTH] IN BLOOD BY AUTOMATED COUNT: 14.3 %
GLUCOSE BLD-MCNC: 109 MG/DL (ref 70–99)
HCT VFR BLD AUTO: 31.4 %
HGB BLD-MCNC: 10.4 G/DL
IMM GRANULOCYTES # BLD AUTO: 0 X10(3) UL (ref 0–1)
IMM GRANULOCYTES NFR BLD: 0 %
LYMPHOCYTES # BLD AUTO: 0.62 X10(3) UL (ref 1–4)
LYMPHOCYTES NFR BLD AUTO: 56.9 %
MCH RBC QN AUTO: 27.1 PG (ref 26–34)
MCHC RBC AUTO-ENTMCNC: 33.1 G/DL (ref 31–37)
MCV RBC AUTO: 81.8 FL
MONOCYTES # BLD AUTO: 0.22 X10(3) UL (ref 0.1–1)
MONOCYTES NFR BLD AUTO: 20.2 %
NEUTROPHILS # BLD AUTO: 0.24 X10 (3) UL (ref 1.5–7.7)
NEUTROPHILS # BLD AUTO: 0.24 X10(3) UL (ref 1.5–7.7)
NEUTROPHILS NFR BLD AUTO: 22 %
OSMOLALITY SERPL CALC.SUM OF ELEC: 286 MOSM/KG (ref 275–295)
PLATELET # BLD AUTO: 97 10(3)UL (ref 150–450)
POTASSIUM SERPL-SCNC: 3.8 MMOL/L (ref 3.5–5.1)
RBC # BLD AUTO: 3.84 X10(6)UL
SODIUM SERPL-SCNC: 138 MMOL/L (ref 136–145)
VANCOMYCIN PEAK SERPL-MCNC: 15.3 UG/ML (ref 30–50)
VANCOMYCIN TROUGH SERPL-MCNC: 9.2 UG/ML (ref 10–20)
WBC # BLD AUTO: 1.1 X10(3) UL (ref 4–11)

## 2023-10-06 PROCEDURE — 02HV33Z INSERTION OF INFUSION DEVICE INTO SUPERIOR VENA CAVA, PERCUTANEOUS APPROACH: ICD-10-PCS | Performed by: INTERNAL MEDICINE

## 2023-10-06 PROCEDURE — 80202 ASSAY OF VANCOMYCIN: CPT | Performed by: STUDENT IN AN ORGANIZED HEALTH CARE EDUCATION/TRAINING PROGRAM

## 2023-10-06 PROCEDURE — 80202 ASSAY OF VANCOMYCIN: CPT | Performed by: INTERNAL MEDICINE

## 2023-10-06 PROCEDURE — 80048 BASIC METABOLIC PNL TOTAL CA: CPT | Performed by: STUDENT IN AN ORGANIZED HEALTH CARE EDUCATION/TRAINING PROGRAM

## 2023-10-06 PROCEDURE — B548ZZA ULTRASONOGRAPHY OF SUPERIOR VENA CAVA, GUIDANCE: ICD-10-PCS | Performed by: INTERNAL MEDICINE

## 2023-10-06 PROCEDURE — 36569 INSJ PICC 5 YR+ W/O IMAGING: CPT

## 2023-10-06 PROCEDURE — 85025 COMPLETE CBC W/AUTO DIFF WBC: CPT | Performed by: STUDENT IN AN ORGANIZED HEALTH CARE EDUCATION/TRAINING PROGRAM

## 2023-10-06 RX ORDER — VANCOMYCIN HYDROCHLORIDE
1250 EVERY 8 HOURS
Status: DISCONTINUED | OUTPATIENT
Start: 2023-10-06 | End: 2023-10-07

## 2023-10-06 RX ORDER — LIDOCAINE HYDROCHLORIDE 10 MG/ML
5 INJECTION, SOLUTION EPIDURAL; INFILTRATION; INTRACAUDAL; PERINEURAL
Status: COMPLETED | OUTPATIENT
Start: 2023-10-06 | End: 2023-10-06

## 2023-10-06 NOTE — PLAN OF CARE
AxO4. VSS on 3L NC. Denies nausea, pain controlled w/ PO PRN pain meds. Voids w/o issue. Dressing L wrist C/D/I. On IV vanco, IV zosyn, PO flagyl. Ambulatory. Patient stating \"feeling better than last night\" Updated on POC. Safety measures placed. Care ongoing.

## 2023-10-06 NOTE — OPERATIVE REPORT
[facility]    PATIENT'S NAME: German Figueroa   ATTENDING PHYSICIAN: Martita Slaughter DO   OPERATING PHYSICIAN: Sunita Ponce M.D. PATIENT ACCOUNT#:   [de-identified]    LOCATION:  [address] [place] [address]  MEDICAL RECORD #:   [id number]       YOB: 1970  ADMISSION DATE:       09/30/2023      OPERATION DATE:  10/05/2023    OPERATIVE REPORT      PREOPERATIVE DIAGNOSIS:    1. Left wrist infection secondary to a cat bite. 2.   Neutropenia. POSTOPERATIVE DIAGNOSIS:    PROCEDURE:  Incision and debridement, excising the area of necrotic skin around his previous incision. ANESTHESIA:  General.      OPERATIVE TECHNIQUE:  After induction of anesthesia, the arm was prepped and draped in sterile fashion. The forearm and upper arm were exsanguinated, and tourniquet elevated to 250 mmHg. The area of necrotic skin was widely excised trying to unload as much of the bacterial burden as possible. There was a little bit of purulent material underneath but mainly within the necrotic skin. Multiple transverse incisions were made proximal on the volar and dorsal aspect and dissected down to the fascia. No evidence of necrotizing fasciitis. The wound was copiously irrigated, and a dressing of Xeroform, 4 x 4's, Kerlix, and an Ace wrap were applied. The patient was awoken and taken to the postanesthesia recovery room in stable condition. No complications.     Dictated By Sunita Ponce M.D.  d: 10/05/2023 16:52:26  t: 10/05/2023 20:34:49  Zain Ruiz [id number]  [facility]/

## 2023-10-06 NOTE — OCCUPATIONAL THERAPY NOTE
OT follow up for splint fitting. Not wearing splint, since pt underwent another procedure on 10/5.  10/5 s/p  Incision and debridement, excising the area of necrotic skin around his previous incision. Will continue to follow. Spoke with RN.

## 2023-10-07 LAB
ANION GAP SERPL CALC-SCNC: 6 MMOL/L (ref 0–18)
BASOPHILS # BLD AUTO: 0.01 X10(3) UL (ref 0–0.2)
BASOPHILS NFR BLD AUTO: 0.8 %
BUN BLD-MCNC: 12 MG/DL (ref 7–18)
CALCIUM BLD-MCNC: 7.8 MG/DL (ref 8.5–10.1)
CHLORIDE SERPL-SCNC: 102 MMOL/L (ref 98–112)
CO2 SERPL-SCNC: 28 MMOL/L (ref 21–32)
CREAT BLD-MCNC: 0.93 MG/DL
EGFRCR SERPLBLD CKD-EPI 2021: 98 ML/MIN/1.73M2 (ref 60–?)
EOSINOPHIL # BLD AUTO: 0.02 X10(3) UL (ref 0–0.7)
EOSINOPHIL NFR BLD AUTO: 1.6 %
ERYTHROCYTE [DISTWIDTH] IN BLOOD BY AUTOMATED COUNT: 14.4 %
GLUCOSE BLD-MCNC: 98 MG/DL (ref 70–99)
HCT VFR BLD AUTO: 31.6 %
HGB BLD-MCNC: 10.5 G/DL
IMM GRANULOCYTES # BLD AUTO: 0.04 X10(3) UL (ref 0–1)
IMM GRANULOCYTES NFR BLD: 3.2 %
LYMPHOCYTES # BLD AUTO: 0.66 X10(3) UL (ref 1–4)
LYMPHOCYTES NFR BLD AUTO: 52.4 %
MCH RBC QN AUTO: 26.9 PG (ref 26–34)
MCHC RBC AUTO-ENTMCNC: 33.2 G/DL (ref 31–37)
MCV RBC AUTO: 81 FL
MONOCYTES # BLD AUTO: 0.28 X10(3) UL (ref 0.1–1)
MONOCYTES NFR BLD AUTO: 22.2 %
NEUTROPHILS # BLD AUTO: 0.25 X10 (3) UL (ref 1.5–7.7)
NEUTROPHILS # BLD AUTO: 0.25 X10(3) UL (ref 1.5–7.7)
NEUTROPHILS NFR BLD AUTO: 19.8 %
OSMOLALITY SERPL CALC.SUM OF ELEC: 282 MOSM/KG (ref 275–295)
PLATELET # BLD AUTO: 91 10(3)UL (ref 150–450)
POTASSIUM SERPL-SCNC: 3.5 MMOL/L (ref 3.5–5.1)
RBC # BLD AUTO: 3.9 X10(6)UL
SODIUM SERPL-SCNC: 136 MMOL/L (ref 136–145)
WBC # BLD AUTO: 1.3 X10(3) UL (ref 4–11)

## 2023-10-07 PROCEDURE — 80048 BASIC METABOLIC PNL TOTAL CA: CPT | Performed by: STUDENT IN AN ORGANIZED HEALTH CARE EDUCATION/TRAINING PROGRAM

## 2023-10-07 PROCEDURE — 85025 COMPLETE CBC W/AUTO DIFF WBC: CPT | Performed by: STUDENT IN AN ORGANIZED HEALTH CARE EDUCATION/TRAINING PROGRAM

## 2023-10-07 NOTE — PLAN OF CARE
Pain to surg site well managed on oral prn medication. VSS, afebrile, O2 prn at night. Encouraged IS use and ankle exercises. AW dsg to lt arm cdi. Pt denies n/t, moves arm without difficulty, np edema noted to fingers. PICC to rt arm. On oral and IV abx, tolerating well. Awaits final culture, monitor labs. DC home when ready.

## 2023-10-07 NOTE — PLAN OF CARE
Alert and oriented x 4. Vitals stable on room air. Pain managed on PO medications. IV and PO antibiotics adm per MAR. Dressing clean dry, intact. PICC line right arm. Voiding without difficulty. Last BM 10/05. Tolerating regular diet. SCD's on bilaterally. Safety precautions in place.

## 2023-10-07 NOTE — PLAN OF CARE
This morning puncture site of wound oozing sanguineous drainage. Dressing removed, applied sterile gauze and pressure dressing to puncture site, xerofoam dressing, applied to surgical site and dressing changed as per order. No additional drainage noted. Denies numbness, tingling, moves fingers. Nonpitting edema noted to left fingers. Left arm elevated on pillow. Picc line placed today. Iv antibiotics infusing. Instructed on plan of care, call don't fall protocol, call for all asst needed, discomfort felt. Verbalized understanding.

## 2023-10-08 LAB
ANION GAP SERPL CALC-SCNC: 5 MMOL/L (ref 0–18)
BASOPHILS # BLD AUTO: 0.01 X10(3) UL (ref 0–0.2)
BASOPHILS NFR BLD AUTO: 0.8 %
BUN BLD-MCNC: 11 MG/DL (ref 7–18)
CALCIUM BLD-MCNC: 7.8 MG/DL (ref 8.5–10.1)
CHLORIDE SERPL-SCNC: 104 MMOL/L (ref 98–112)
CO2 SERPL-SCNC: 28 MMOL/L (ref 21–32)
CREAT BLD-MCNC: 0.91 MG/DL
EGFRCR SERPLBLD CKD-EPI 2021: 101 ML/MIN/1.73M2 (ref 60–?)
EOSINOPHIL # BLD AUTO: 0.02 X10(3) UL (ref 0–0.7)
EOSINOPHIL NFR BLD AUTO: 1.7 %
ERYTHROCYTE [DISTWIDTH] IN BLOOD BY AUTOMATED COUNT: 14.8 %
GLUCOSE BLD-MCNC: 109 MG/DL (ref 70–99)
HCT VFR BLD AUTO: 30.8 %
HGB BLD-MCNC: 10.4 G/DL
IMM GRANULOCYTES # BLD AUTO: 0.05 X10(3) UL (ref 0–1)
IMM GRANULOCYTES NFR BLD: 4.2 %
LYMPHOCYTES # BLD AUTO: 0.66 X10(3) UL (ref 1–4)
LYMPHOCYTES NFR BLD AUTO: 55.9 %
MCH RBC QN AUTO: 27.5 PG (ref 26–34)
MCHC RBC AUTO-ENTMCNC: 33.8 G/DL (ref 31–37)
MCV RBC AUTO: 81.5 FL
MONOCYTES # BLD AUTO: 0.24 X10(3) UL (ref 0.1–1)
MONOCYTES NFR BLD AUTO: 20.3 %
NEUTROPHILS # BLD AUTO: 0.2 X10 (3) UL (ref 1.5–7.7)
NEUTROPHILS # BLD AUTO: 0.2 X10(3) UL (ref 1.5–7.7)
NEUTROPHILS NFR BLD AUTO: 17.1 %
OSMOLALITY SERPL CALC.SUM OF ELEC: 284 MOSM/KG (ref 275–295)
PLATELET # BLD AUTO: 81 10(3)UL (ref 150–450)
POTASSIUM SERPL-SCNC: 3.6 MMOL/L (ref 3.5–5.1)
RBC # BLD AUTO: 3.78 X10(6)UL
SODIUM SERPL-SCNC: 137 MMOL/L (ref 136–145)
WBC # BLD AUTO: 1.2 X10(3) UL (ref 4–11)

## 2023-10-08 PROCEDURE — 85025 COMPLETE CBC W/AUTO DIFF WBC: CPT | Performed by: STUDENT IN AN ORGANIZED HEALTH CARE EDUCATION/TRAINING PROGRAM

## 2023-10-08 PROCEDURE — 80048 BASIC METABOLIC PNL TOTAL CA: CPT | Performed by: STUDENT IN AN ORGANIZED HEALTH CARE EDUCATION/TRAINING PROGRAM

## 2023-10-08 NOTE — PLAN OF CARE
Pt AOx4. VSS on 2L O2 NC nightly prn. PICC line intact and functioning, IV abx infusing as ordered. NWB to RUE. Kerlix dressing intact to left arm - elevated with pillow support. Denies pain at this time. Plan of care updated with pt and family. Will continue to monitor.

## 2023-10-08 NOTE — PLAN OF CARE
Alert and oriented x 4. Vitals stable on room air. Pain managed on PO medication. PO and IV abx adm per MAR. PICC line right arm. Dressing changed. Voiding without difficulty. Last BM 10/07. Tolerating regular diet. bilaterally. Safety precautions in place.

## 2023-10-09 ENCOUNTER — APPOINTMENT (OUTPATIENT)
Dept: CT IMAGING | Facility: HOSPITAL | Age: 53
End: 2023-10-09
Attending: STUDENT IN AN ORGANIZED HEALTH CARE EDUCATION/TRAINING PROGRAM
Payer: COMMERCIAL

## 2023-10-09 LAB
ANION GAP SERPL CALC-SCNC: 5 MMOL/L (ref 0–18)
BASOPHILS # BLD AUTO: 0.02 X10(3) UL (ref 0–0.2)
BASOPHILS NFR BLD AUTO: 1.1 %
BUN BLD-MCNC: 10 MG/DL (ref 7–18)
CALCIUM BLD-MCNC: 8.3 MG/DL (ref 8.5–10.1)
CHLORIDE SERPL-SCNC: 104 MMOL/L (ref 98–112)
CO2 SERPL-SCNC: 27 MMOL/L (ref 21–32)
CREAT BLD-MCNC: 0.92 MG/DL
EGFRCR SERPLBLD CKD-EPI 2021: 99 ML/MIN/1.73M2 (ref 60–?)
EOSINOPHIL # BLD AUTO: 0.02 X10(3) UL (ref 0–0.7)
EOSINOPHIL NFR BLD AUTO: 1.1 %
ERYTHROCYTE [DISTWIDTH] IN BLOOD BY AUTOMATED COUNT: 15.1 %
GLUCOSE BLD-MCNC: 115 MG/DL (ref 70–99)
HCT VFR BLD AUTO: 32.3 %
HGB BLD-MCNC: 10.6 G/DL
IMM GRANULOCYTES # BLD AUTO: 0.01 X10(3) UL (ref 0–1)
IMM GRANULOCYTES NFR BLD: 0.5 %
INR BLD: 1.23 (ref 0.85–1.16)
LYMPHOCYTES # BLD AUTO: 0.94 X10(3) UL (ref 1–4)
LYMPHOCYTES NFR BLD AUTO: 50.3 %
MCH RBC QN AUTO: 26.7 PG (ref 26–34)
MCHC RBC AUTO-ENTMCNC: 32.8 G/DL (ref 31–37)
MCV RBC AUTO: 81.4 FL
MONOCYTES # BLD AUTO: 0.35 X10(3) UL (ref 0.1–1)
MONOCYTES NFR BLD AUTO: 18.7 %
NEUTROPHILS # BLD AUTO: 0.53 X10 (3) UL (ref 1.5–7.7)
NEUTROPHILS # BLD AUTO: 0.53 X10(3) UL (ref 1.5–7.7)
NEUTROPHILS NFR BLD AUTO: 28.3 %
OSMOLALITY SERPL CALC.SUM OF ELEC: 282 MOSM/KG (ref 275–295)
PLATELET # BLD AUTO: 74 10(3)UL (ref 150–450)
POTASSIUM SERPL-SCNC: 3.7 MMOL/L (ref 3.5–5.1)
PROTHROMBIN TIME: 15.5 SECONDS (ref 11.6–14.8)
RBC # BLD AUTO: 3.97 X10(6)UL
SODIUM SERPL-SCNC: 136 MMOL/L (ref 136–145)
WBC # BLD AUTO: 1.9 X10(3) UL (ref 4–11)

## 2023-10-09 PROCEDURE — 88185 FLOWCYTOMETRY/TC ADD-ON: CPT | Performed by: RADIOLOGY

## 2023-10-09 PROCEDURE — 77012 CT SCAN FOR NEEDLE BIOPSY: CPT | Performed by: STUDENT IN AN ORGANIZED HEALTH CARE EDUCATION/TRAINING PROGRAM

## 2023-10-09 PROCEDURE — 88264 CHROMOSOME ANALYSIS 20-25: CPT | Performed by: RADIOLOGY

## 2023-10-09 PROCEDURE — 88313 SPECIAL STAINS GROUP 2: CPT | Performed by: STUDENT IN AN ORGANIZED HEALTH CARE EDUCATION/TRAINING PROGRAM

## 2023-10-09 PROCEDURE — 88237 TISSUE CULTURE BONE MARROW: CPT | Performed by: RADIOLOGY

## 2023-10-09 PROCEDURE — 88305 TISSUE EXAM BY PATHOLOGIST: CPT | Performed by: STUDENT IN AN ORGANIZED HEALTH CARE EDUCATION/TRAINING PROGRAM

## 2023-10-09 PROCEDURE — 85025 COMPLETE CBC W/AUTO DIFF WBC: CPT | Performed by: STUDENT IN AN ORGANIZED HEALTH CARE EDUCATION/TRAINING PROGRAM

## 2023-10-09 PROCEDURE — 99152 MOD SED SAME PHYS/QHP 5/>YRS: CPT | Performed by: STUDENT IN AN ORGANIZED HEALTH CARE EDUCATION/TRAINING PROGRAM

## 2023-10-09 PROCEDURE — 85610 PROTHROMBIN TIME: CPT | Performed by: STUDENT IN AN ORGANIZED HEALTH CARE EDUCATION/TRAINING PROGRAM

## 2023-10-09 PROCEDURE — 88184 FLOWCYTOMETRY/ TC 1 MARKER: CPT | Performed by: RADIOLOGY

## 2023-10-09 PROCEDURE — 38222 DX BONE MARROW BX & ASPIR: CPT | Performed by: STUDENT IN AN ORGANIZED HEALTH CARE EDUCATION/TRAINING PROGRAM

## 2023-10-09 PROCEDURE — 85097 BONE MARROW INTERPRETATION: CPT | Performed by: STUDENT IN AN ORGANIZED HEALTH CARE EDUCATION/TRAINING PROGRAM

## 2023-10-09 PROCEDURE — 88342 IMHCHEM/IMCYTCHM 1ST ANTB: CPT | Performed by: STUDENT IN AN ORGANIZED HEALTH CARE EDUCATION/TRAINING PROGRAM

## 2023-10-09 PROCEDURE — 88291 CYTO/MOLECULAR REPORT: CPT | Performed by: RADIOLOGY

## 2023-10-09 PROCEDURE — 88341 IMHCHEM/IMCYTCHM EA ADD ANTB: CPT | Performed by: STUDENT IN AN ORGANIZED HEALTH CARE EDUCATION/TRAINING PROGRAM

## 2023-10-09 PROCEDURE — 80048 BASIC METABOLIC PNL TOTAL CA: CPT | Performed by: STUDENT IN AN ORGANIZED HEALTH CARE EDUCATION/TRAINING PROGRAM

## 2023-10-09 PROCEDURE — 07DR3ZX EXTRACTION OF ILIAC BONE MARROW, PERCUTANEOUS APPROACH, DIAGNOSTIC: ICD-10-PCS | Performed by: RADIOLOGY

## 2023-10-09 RX ORDER — SODIUM CHLORIDE 9 MG/ML
INJECTION, SOLUTION INTRAVENOUS CONTINUOUS
Status: DISCONTINUED | OUTPATIENT
Start: 2023-10-09 | End: 2023-10-09 | Stop reason: HOSPADM

## 2023-10-09 RX ORDER — MIDAZOLAM HYDROCHLORIDE 1 MG/ML
INJECTION INTRAMUSCULAR; INTRAVENOUS
Status: COMPLETED
Start: 2023-10-09 | End: 2023-10-09

## 2023-10-09 RX ORDER — NALOXONE HYDROCHLORIDE 0.4 MG/ML
80 INJECTION, SOLUTION INTRAMUSCULAR; INTRAVENOUS; SUBCUTANEOUS AS NEEDED
Status: DISCONTINUED | OUTPATIENT
Start: 2023-10-09 | End: 2023-10-09 | Stop reason: HOSPADM

## 2023-10-09 RX ORDER — MIDAZOLAM HYDROCHLORIDE 1 MG/ML
1 INJECTION INTRAMUSCULAR; INTRAVENOUS EVERY 5 MIN PRN
Status: DISCONTINUED | OUTPATIENT
Start: 2023-10-09 | End: 2023-10-09 | Stop reason: HOSPADM

## 2023-10-09 RX ORDER — FLUMAZENIL 0.1 MG/ML
0.2 INJECTION INTRAVENOUS AS NEEDED
Status: DISCONTINUED | OUTPATIENT
Start: 2023-10-09 | End: 2023-10-09 | Stop reason: HOSPADM

## 2023-10-09 NOTE — PROCEDURES
[facility]  Pre-Procedure Note    Name: Aide Hampton  MRN#: [id number]  : 3/9/1970    Procedure:  CT guided bone marrow aspiration and biopsy    Indication: Pancytopenia    Allergies:    No Known Allergies    Pertinent Medications:    Is patient on any Aspirin, Coumadin, or any other Anticoagulations/Antiplatelet medications? no      Mental Status:  Alert and Oriented      Health Status: Acceptable for Procedure    Impression and Plans:    Pancytopenia. Has been treated for cellulitis. Request from [facility] for CT guided bone marrow aspiration and biopsy. I have reviewed the above information prior to procedure. I have discussed the risks and benefits and alternatives with the patient. The patient understands and agrees to proceed with plan of care.     Lily Beaulieu MD

## 2023-10-09 NOTE — OCCUPATIONAL THERAPY NOTE
Attempted to see patient for OT services this am, however patient currently off the floor for bone marrow aspiration and biopsy. Will reattempt as able and as patient appropriate.

## 2023-10-09 NOTE — PLAN OF CARE
Pt A&Ox4, resting in bed. Resp: RA  GI/: voids normally  Activity/Safety: up w/asst  Skin: LUE redness r/t cellulitis  Pain: no meds requested at this time  Pt updated on and agreeable to POC; IV abx, await cultures, dressing changes.

## 2023-10-09 NOTE — PLAN OF CARE
Pt Aox4, RA, . Up to bathroom voiding. Ambulates ad barbara, encouraged walking in wallace. PICC to ARIANNE. IV Zosyn infusing. Dressing to L forearm CDI. Wound cultures pending. NPO for CT guided bone marrow biopsy today.

## 2023-10-09 NOTE — OCCUPATIONAL THERAPY NOTE
OT spoke with Dr. Lore Robles MD stated to DC splint at this time and follow up with OP OT after DC for hand therapy and splinting at that point as necessary. OT will sign off IP and rec follow up OP after DC.

## 2023-10-09 NOTE — IMAGING NOTE
NPO status verified  Pertinent labs and radiology imaging reviewed  Consent signed and on file    Patient tolerated procedural sedation without any immediate complications noted. Biopsy site to left illiac crest/left lower back with topical ointment and tegaderm dressing. C/D/I. Patient denies pain. Report given to [employer]. Patient transported to [facility] accompanied by RN and transport.

## 2023-10-09 NOTE — PROCEDURES
[address] [facility] Patient Status:  Inpatient    3/9/1970 MRN [id number]   [facility] 3SW-A Attending Rian Mg, [address] [facility] [address] Day # 9 PCP Keith Lee MD         Brief Procedure Report    Pre-Operative Diagnosis: Pancytopenia    Post-Operative Diagnosis: Same as above. Procedure Performed: CT guided left iliac bone marrow aspiration and biopsy    Anesthesia: 1% lidocaine. Moderate sedation    EBL: <2AR    Complications: None    Summary of Case: 11g left iliac bone marrow aspiration and biopsy performed with CT guidance. Patient tolerated procedure well without immediate complication. Full report to follow in PACS.     Iker Anglin

## 2023-10-10 LAB
BASOPHILS # BLD AUTO: 0.01 X10(3) UL (ref 0–0.2)
BASOPHILS NFR BLD AUTO: 0.4 %
EOSINOPHIL # BLD AUTO: 0.01 X10(3) UL (ref 0–0.7)
EOSINOPHIL NFR BLD AUTO: 0.4 %
ERYTHROCYTE [DISTWIDTH] IN BLOOD BY AUTOMATED COUNT: 15.9 %
HCT VFR BLD AUTO: 32.7 %
HGB BLD-MCNC: 10.6 G/DL
IMM GRANULOCYTES # BLD AUTO: 0.03 X10(3) UL (ref 0–1)
IMM GRANULOCYTES NFR BLD: 1.1 %
LYMPHOCYTES # BLD AUTO: 1.13 X10(3) UL (ref 1–4)
LYMPHOCYTES NFR BLD AUTO: 40.5 %
MCH RBC QN AUTO: 27 PG (ref 26–34)
MCHC RBC AUTO-ENTMCNC: 32.4 G/DL (ref 31–37)
MCV RBC AUTO: 83.2 FL
MONOCYTES # BLD AUTO: 0.44 X10(3) UL (ref 0.1–1)
MONOCYTES NFR BLD AUTO: 15.8 %
NEUTROPHILS # BLD AUTO: 1.17 X10 (3) UL (ref 1.5–7.7)
NEUTROPHILS # BLD AUTO: 1.17 X10(3) UL (ref 1.5–7.7)
NEUTROPHILS NFR BLD AUTO: 41.8 %
PLATELET # BLD AUTO: 64 10(3)UL (ref 150–450)
RBC # BLD AUTO: 3.93 X10(6)UL
WBC # BLD AUTO: 2.8 X10(3) UL (ref 4–11)

## 2023-10-10 PROCEDURE — 85025 COMPLETE CBC W/AUTO DIFF WBC: CPT | Performed by: INTERNAL MEDICINE

## 2023-10-10 NOTE — PLAN OF CARE
Pt Aox4, RA, . PICC to ARIANNE. IV Zosyn. Leonard for pain with adequate relief. Dressing to L arm CDI. Plastic surgery to see today. Bone marrow biopsy pending. Pt up ad barbara. Family at bedside.

## 2023-10-10 NOTE — CM/SW NOTE
Met with pt to discuss DC planning. Pt s/p PICC placement. Orders for final IV abx pending. Pt with new plastics consult today. Per pt, plastics indicated he may need additional washout. Pt with some improvement in mobility with his fingers and he feels he may be able to manage IV abx at home. Discussed plan for referral to [facility] providers. Alida Hooper already accepted pt for home infusion pending final orders. Pt would benefit from bedside teaching prior to DC once final IV abx orders are received. Referral sent to [facility] providers via [address]. Updates sent to Alida Hooper. / to remain available for support and/or discharge planning.      Danielito Mcdonough [facility]  Discharge Planner  [phone]

## 2023-10-11 LAB
ALBUMIN SERPL-MCNC: 2.7 G/DL (ref 3.4–5)
ALBUMIN/GLOB SERPL: 0.6 {RATIO} (ref 1–2)
ALP LIVER SERPL-CCNC: 96 U/L
ALT SERPL-CCNC: 152 U/L
ANION GAP SERPL CALC-SCNC: 7 MMOL/L (ref 0–18)
AST SERPL-CCNC: 69 U/L (ref 15–37)
BASOPHILS # BLD AUTO: 0.01 X10(3) UL (ref 0–0.2)
BASOPHILS NFR BLD AUTO: 0.3 %
BILIRUB SERPL-MCNC: 0.4 MG/DL (ref 0.1–2)
BUN BLD-MCNC: 8 MG/DL (ref 7–18)
CALCIUM BLD-MCNC: 8.4 MG/DL (ref 8.5–10.1)
CD10 CELLS NFR SPEC: <1 %
CD10/CD19: <1 %
CD19 CELLS NFR SPEC: 4 %
CD19+/CD200+: <1 %
CD2 CELLS NFR SPEC: 94 %
CD20 CELLS NFR SPEC: 4 %
CD200 CELLS: 1 %
CD3 CELLS NFR SPEC: 93 %
CD3+/TCRGD+: 2 %
CD3+CD4+ CELLS NFR SPEC: 27 %
CD3+CD4+ CELLS/CD3+CD8+ CLL SPEC: 0.4
CD3+CD8+ CELLS NFR SPEC: 62 %
CD3-/CD56+: 1 %
CD34 CELLS NFR SPEC: <1 %
CD38 CELLS NFR SPEC: 1 %
CD38+/CD19+: <1 %
CD45 CELLS NFR SPEC: 100 %
CD5 CELLS NFR SPEC: 91 %
CD5/CD19 CELLS: <1 %
CD7 CELLS NFR SPEC: 90 %
CELL SURF KAPPA/LAMBDA RATIO: 1
CELL SURF LAMBDA LIGHT CHAIN: 2 %
CELL SURFACE KAPPA LIGHT CHAIN: 2 %
CHLORIDE SERPL-SCNC: 105 MMOL/L (ref 98–112)
CO2 SERPL-SCNC: 23 MMOL/L (ref 21–32)
CREAT BLD-MCNC: 0.96 MG/DL
EGFRCR SERPLBLD CKD-EPI 2021: 95 ML/MIN/1.73M2 (ref 60–?)
EOSINOPHIL # BLD AUTO: 0.03 X10(3) UL (ref 0–0.7)
EOSINOPHIL NFR BLD AUTO: 1 %
ERYTHROCYTE [DISTWIDTH] IN BLOOD BY AUTOMATED COUNT: 16.2 %
GLOBULIN PLAS-MCNC: 4.7 G/DL (ref 2.8–4.4)
GLUCOSE BLD-MCNC: 110 MG/DL (ref 70–99)
HCT VFR BLD AUTO: 32.5 %
HGB BLD-MCNC: 10.5 G/DL
IMM GRANULOCYTES # BLD AUTO: 0.03 X10(3) UL (ref 0–1)
IMM GRANULOCYTES NFR BLD: 1 %
LYMPHOCYTES # BLD AUTO: 1.12 X10(3) UL (ref 1–4)
LYMPHOCYTES NFR BLD AUTO: 36.8 %
MCH RBC QN AUTO: 27.1 PG (ref 26–34)
MCHC RBC AUTO-ENTMCNC: 32.3 G/DL (ref 31–37)
MCV RBC AUTO: 83.8 FL
MONOCYTES # BLD AUTO: 0.46 X10(3) UL (ref 0.1–1)
MONOCYTES NFR BLD AUTO: 15.1 %
NEUTROPHILS # BLD AUTO: 1.39 X10 (3) UL (ref 1.5–7.7)
NEUTROPHILS # BLD AUTO: 1.39 X10(3) UL (ref 1.5–7.7)
NEUTROPHILS NFR BLD AUTO: 45.8 %
OSMOLALITY SERPL CALC.SUM OF ELEC: 279 MOSM/KG (ref 275–295)
PLATELET # BLD AUTO: 67 10(3)UL (ref 150–450)
POTASSIUM SERPL-SCNC: 4 MMOL/L (ref 3.5–5.1)
PROT SERPL-MCNC: 7.4 G/DL (ref 6.4–8.2)
RBC # BLD AUTO: 3.88 X10(6)UL
SODIUM SERPL-SCNC: 135 MMOL/L (ref 136–145)
TCR G-D CELLS NFR SPEC: 2 %
WBC # BLD AUTO: 3 X10(3) UL (ref 4–11)

## 2023-10-11 PROCEDURE — 85025 COMPLETE CBC W/AUTO DIFF WBC: CPT | Performed by: ORTHOPAEDIC SURGERY

## 2023-10-11 PROCEDURE — 80053 COMPREHEN METABOLIC PANEL: CPT | Performed by: ORTHOPAEDIC SURGERY

## 2023-10-11 RX ORDER — ERTAPENEM 1 G/1
1 INJECTION, POWDER, LYOPHILIZED, FOR SOLUTION INTRAMUSCULAR; INTRAVENOUS DAILY
Qty: 14 EACH | Refills: 0 | Status: SHIPPED | OUTPATIENT
Start: 2023-10-11 | End: 2023-10-25

## 2023-10-11 NOTE — PLAN OF CARE
Alert and oriented x4. VSS. On RA. . Tolerating diet. Voiding freely. Denies pain at this time. Kerlix dressing to left hand C/D/I. Right PICC in place. Up ad barbara. IV abx as ordered. Bone marrow biopsy pending. Plan is dc with HH when medically cleared. Call light within reach.

## 2023-10-11 NOTE — CM/SW NOTE
Order for home IV ab just placed and sent to [facility]- await insurance ok for the IV abs. Mechelle to follow for [facility] to [place] if [facility] can get insurance approval for the IV abs.       Yaya Martínez LCSW  /Discharge Planner

## 2023-10-11 NOTE — CM/SW NOTE
[facility] informed sw that they will niot get insurance approval until tomorrow.     Jaja Booth, JUSTINW  /Discharge Planner

## 2023-10-11 NOTE — PROGRESS NOTES
Continue wound care with outpatient wound care  Fu plastic surgery in 1 week for further Debridement versus flap coverage  IV abx per ID  Ok for discharge per Rita Nuñez MD

## 2023-10-11 NOTE — CM/SW NOTE
10/11/23 1000   Choice of Post-Acute Provider   Informed patient of right to choose their preferred provider Yes   List of appropriate post-acute services provided to patient/family with quality data Yes   Patient/family choice Mechelle   Information given to Patient     [facility]- he chose [facility]- reserved in [facility]. Will need orders from ID for IV abs at [place].     Tony Barry LCSW  /Discharge Planner

## 2023-10-12 VITALS
WEIGHT: 195 LBS | BODY MASS INDEX: 27.92 KG/M2 | OXYGEN SATURATION: 91 % | TEMPERATURE: 98 F | HEART RATE: 100 BPM | DIASTOLIC BLOOD PRESSURE: 87 MMHG | SYSTOLIC BLOOD PRESSURE: 124 MMHG | RESPIRATION RATE: 18 BRPM | HEIGHT: 70 IN

## 2023-10-12 LAB
ANION GAP SERPL CALC-SCNC: 8 MMOL/L (ref 0–18)
BASOPHILS # BLD AUTO: 0.02 X10(3) UL (ref 0–0.2)
BASOPHILS NFR BLD AUTO: 0.6 %
BUN BLD-MCNC: 11 MG/DL (ref 7–18)
CALCIUM BLD-MCNC: 8.5 MG/DL (ref 8.5–10.1)
CHLORIDE SERPL-SCNC: 105 MMOL/L (ref 98–112)
CO2 SERPL-SCNC: 24 MMOL/L (ref 21–32)
CREAT BLD-MCNC: 1.1 MG/DL
EGFRCR SERPLBLD CKD-EPI 2021: 80 ML/MIN/1.73M2 (ref 60–?)
EOSINOPHIL # BLD AUTO: 0.02 X10(3) UL (ref 0–0.7)
EOSINOPHIL NFR BLD AUTO: 0.6 %
ERYTHROCYTE [DISTWIDTH] IN BLOOD BY AUTOMATED COUNT: 16.5 %
GLUCOSE BLD-MCNC: 105 MG/DL (ref 70–99)
HCT VFR BLD AUTO: 38.2 %
HGB BLD-MCNC: 12.1 G/DL
IMM GRANULOCYTES # BLD AUTO: 0.02 X10(3) UL (ref 0–1)
IMM GRANULOCYTES NFR BLD: 0.6 %
LYMPHOCYTES # BLD AUTO: 1.27 X10(3) UL (ref 1–4)
LYMPHOCYTES NFR BLD AUTO: 37.8 %
MCH RBC QN AUTO: 26.7 PG (ref 26–34)
MCHC RBC AUTO-ENTMCNC: 31.7 G/DL (ref 31–37)
MCV RBC AUTO: 84.3 FL
MONOCYTES # BLD AUTO: 0.61 X10(3) UL (ref 0.1–1)
MONOCYTES NFR BLD AUTO: 18.2 %
NEUTROPHILS # BLD AUTO: 1.42 X10 (3) UL (ref 1.5–7.7)
NEUTROPHILS # BLD AUTO: 1.42 X10(3) UL (ref 1.5–7.7)
NEUTROPHILS NFR BLD AUTO: 42.2 %
OSMOLALITY SERPL CALC.SUM OF ELEC: 284 MOSM/KG (ref 275–295)
PLATELET # BLD AUTO: 84 10(3)UL (ref 150–450)
POTASSIUM SERPL-SCNC: 4.3 MMOL/L (ref 3.5–5.1)
RBC # BLD AUTO: 4.53 X10(6)UL
SODIUM SERPL-SCNC: 137 MMOL/L (ref 136–145)
WBC # BLD AUTO: 3.4 X10(3) UL (ref 4–11)

## 2023-10-12 PROCEDURE — 85025 COMPLETE CBC W/AUTO DIFF WBC: CPT | Performed by: INTERNAL MEDICINE

## 2023-10-12 PROCEDURE — 80048 BASIC METABOLIC PNL TOTAL CA: CPT | Performed by: INTERNAL MEDICINE

## 2023-10-12 RX ORDER — HYDROCODONE BITARTRATE AND ACETAMINOPHEN 5; 325 MG/1; MG/1
1 TABLET ORAL EVERY 4 HOURS PRN
Qty: 20 TABLET | Refills: 0 | Status: SHIPPED | OUTPATIENT
Start: 2023-10-12

## 2023-10-12 NOTE — PROGRESS NOTES
AVS reviewed, Invanz given at 12p, next dose due tomorrow , will dc home w/ [facility], dsg supplies given , instructed by Martínez Sellers RN re: dsg changes.

## 2023-10-12 NOTE — PLAN OF CARE
Pt Aox4, RA . PICC to ARIANNE. IV Zosyn. Dressing to L arm changed CDI. Up ad barbara Voiding. Denies pain. Plan to Dc tomorrow awaiting insurance auth for IV antibiotics, plan to go home on Invanz.

## 2023-10-12 NOTE — PLAN OF CARE
Patient A&Ox4, dressing to L hand changed this morning by [address] Ernesto Prado RN, C/D/I. PICC intact and patent in RUE. Ambulating, voiding freely, LBM 10/11. Plan to DC home today with IV ABX and HHC. 12:30: Reviewed dressing changes with patient. Patient verbalized understanding.

## 2023-10-12 NOTE — PLAN OF CARE
Alert and oriented x4. VSS. On RA. . Tolerating diet. Voiding freely. Pain controlled with PRN medication. Kerlix dressing to left hand C/D/I. Right PICC in place. Up ad barbara. IV abx and IVF as ordered. Bone marrow biopsy pending. Plan to dc today awaiting ins auth for IV abx. Call light within reach.

## 2023-10-12 NOTE — CM/SW NOTE
Spoke with Aileen Delgado from [address] [place] [address] who confirmed they have insurance approval. Pt will have out of pocket costs of $345/week until he reaches his $1500 deductible. He is then covered at 90%. They will arrange for delivery of meds/supplies this evening. Message sent to [facility] via [employer] to confirm need for [facility] tomorrow. Met with pt at bedside. Reviewed out of pocket costs for home infusion as above. Pt agreeable with planning and costs. He is eager for DC today. Updated pt's RN. Pt will need dose of IV invanz prior to DC and instruction for daily dressing changes. / to remain available for support and/or discharge planning.      Antoine Cesar, [facility]  Discharge Planner  [phone]

## 2023-10-13 NOTE — PAYOR COMM NOTE
Discharge Notification    Patient Name: Nancy Null: SHOSHANA Isidro Sow #: [id number]  Authorization Number: [id number]  Admit Date/Time: 9/30/2023 12:13 PM  Discharge Date/Time: 10/12/2023 1:35 PM

## 2023-10-18 LAB
CELLS ANALYZED LEUK/LYM: 20
CELLS COUNTED LEUK/LYM: 20
CELLS KARYOTYPED LEUK/LYM: 2
GTG BAND LEUK/LYM: 400

## 2025-06-18 RX ORDER — DICLOFENAC SODIUM 75 MG/1
75 TABLET, DELAYED RELEASE ORAL 2 TIMES DAILY PRN
COMMUNITY
Start: 2024-04-30

## 2025-06-19 ENCOUNTER — ANESTHESIA EVENT (OUTPATIENT)
Dept: SURGERY | Facility: HOSPITAL | Age: 55
End: 2025-06-19
Payer: COMMERCIAL

## 2025-06-19 ENCOUNTER — ANESTHESIA (OUTPATIENT)
Dept: SURGERY | Facility: HOSPITAL | Age: 55
End: 2025-06-19
Payer: COMMERCIAL

## 2025-06-19 ENCOUNTER — HOSPITAL ENCOUNTER (OUTPATIENT)
Facility: HOSPITAL | Age: 55
Setting detail: HOSPITAL OUTPATIENT SURGERY
Discharge: HOME | End: 2025-06-19
Attending: COLON & RECTAL SURGERY | Admitting: COLON & RECTAL SURGERY
Payer: COMMERCIAL

## 2025-06-19 VITALS
TEMPERATURE: 98 F | RESPIRATION RATE: 12 BRPM | HEART RATE: 81 BPM | HEIGHT: 70 IN | OXYGEN SATURATION: 97 % | DIASTOLIC BLOOD PRESSURE: 57 MMHG | BODY MASS INDEX: 30.21 KG/M2 | WEIGHT: 211 LBS | SYSTOLIC BLOOD PRESSURE: 118 MMHG

## 2025-06-19 RX ORDER — DEXAMETHASONE SODIUM PHOSPHATE 4 MG/ML
VIAL (ML) INJECTION AS NEEDED
Status: DISCONTINUED | OUTPATIENT
Start: 2025-06-19 | End: 2025-06-19 | Stop reason: SURG

## 2025-06-19 RX ORDER — MORPHINE SULFATE 4 MG/ML
2 INJECTION, SOLUTION INTRAMUSCULAR; INTRAVENOUS EVERY 10 MIN PRN
Status: DISCONTINUED | OUTPATIENT
Start: 2025-06-19 | End: 2025-06-19

## 2025-06-19 RX ORDER — ROCURONIUM BROMIDE 10 MG/ML
INJECTION, SOLUTION INTRAVENOUS AS NEEDED
Status: DISCONTINUED | OUTPATIENT
Start: 2025-06-19 | End: 2025-06-19 | Stop reason: SURG

## 2025-06-19 RX ORDER — MORPHINE SULFATE 10 MG/ML
6 INJECTION, SOLUTION INTRAMUSCULAR; INTRAVENOUS EVERY 10 MIN PRN
Status: DISCONTINUED | OUTPATIENT
Start: 2025-06-19 | End: 2025-06-19

## 2025-06-19 RX ORDER — LIDOCAINE HYDROCHLORIDE 20 MG/ML
INJECTION, SOLUTION EPIDURAL; INFILTRATION; INTRACAUDAL; PERINEURAL AS NEEDED
Status: DISCONTINUED | OUTPATIENT
Start: 2025-06-19 | End: 2025-06-19 | Stop reason: SURG

## 2025-06-19 RX ORDER — MORPHINE SULFATE 4 MG/ML
4 INJECTION, SOLUTION INTRAMUSCULAR; INTRAVENOUS EVERY 10 MIN PRN
Status: DISCONTINUED | OUTPATIENT
Start: 2025-06-19 | End: 2025-06-19

## 2025-06-19 RX ORDER — FAMOTIDINE 10 MG/ML
20 INJECTION, SOLUTION INTRAVENOUS ONCE
Status: COMPLETED | OUTPATIENT
Start: 2025-06-19 | End: 2025-06-19

## 2025-06-19 RX ORDER — FAMOTIDINE 20 MG/1
20 TABLET, FILM COATED ORAL ONCE
Status: COMPLETED | OUTPATIENT
Start: 2025-06-19 | End: 2025-06-19

## 2025-06-19 RX ORDER — GLYCOPYRROLATE 0.2 MG/ML
INJECTION, SOLUTION INTRAMUSCULAR; INTRAVENOUS AS NEEDED
Status: DISCONTINUED | OUTPATIENT
Start: 2025-06-19 | End: 2025-06-19 | Stop reason: SURG

## 2025-06-19 RX ORDER — PHENYLEPHRINE HCL 10 MG/ML
VIAL (ML) INJECTION AS NEEDED
Status: DISCONTINUED | OUTPATIENT
Start: 2025-06-19 | End: 2025-06-19 | Stop reason: SURG

## 2025-06-19 RX ORDER — SODIUM CHLORIDE, SODIUM LACTATE, POTASSIUM CHLORIDE, CALCIUM CHLORIDE 600; 310; 30; 20 MG/100ML; MG/100ML; MG/100ML; MG/100ML
INJECTION, SOLUTION INTRAVENOUS CONTINUOUS
Status: DISCONTINUED | OUTPATIENT
Start: 2025-06-19 | End: 2025-06-19

## 2025-06-19 RX ORDER — ONDANSETRON 2 MG/ML
INJECTION INTRAMUSCULAR; INTRAVENOUS AS NEEDED
Status: DISCONTINUED | OUTPATIENT
Start: 2025-06-19 | End: 2025-06-19 | Stop reason: SURG

## 2025-06-19 RX ORDER — METOCLOPRAMIDE 10 MG/1
10 TABLET ORAL ONCE
Status: COMPLETED | OUTPATIENT
Start: 2025-06-19 | End: 2025-06-19

## 2025-06-19 RX ORDER — NALOXONE HYDROCHLORIDE 0.4 MG/ML
0.08 INJECTION, SOLUTION INTRAMUSCULAR; INTRAVENOUS; SUBCUTANEOUS AS NEEDED
Status: DISCONTINUED | OUTPATIENT
Start: 2025-06-19 | End: 2025-06-19

## 2025-06-19 RX ORDER — METOCLOPRAMIDE HYDROCHLORIDE 5 MG/ML
10 INJECTION INTRAMUSCULAR; INTRAVENOUS ONCE
Status: COMPLETED | OUTPATIENT
Start: 2025-06-19 | End: 2025-06-19

## 2025-06-19 RX ORDER — HYDROMORPHONE HYDROCHLORIDE 1 MG/ML
0.4 INJECTION, SOLUTION INTRAMUSCULAR; INTRAVENOUS; SUBCUTANEOUS EVERY 5 MIN PRN
Status: DISCONTINUED | OUTPATIENT
Start: 2025-06-19 | End: 2025-06-19

## 2025-06-19 RX ORDER — HYDROMORPHONE HYDROCHLORIDE 1 MG/ML
0.2 INJECTION, SOLUTION INTRAMUSCULAR; INTRAVENOUS; SUBCUTANEOUS EVERY 5 MIN PRN
Status: DISCONTINUED | OUTPATIENT
Start: 2025-06-19 | End: 2025-06-19

## 2025-06-19 RX ORDER — ACETAMINOPHEN 500 MG
1000 TABLET ORAL ONCE
Status: DISCONTINUED | OUTPATIENT
Start: 2025-06-19 | End: 2025-06-19 | Stop reason: HOSPADM

## 2025-06-19 RX ORDER — MIDAZOLAM HYDROCHLORIDE 1 MG/ML
INJECTION INTRAMUSCULAR; INTRAVENOUS AS NEEDED
Status: DISCONTINUED | OUTPATIENT
Start: 2025-06-19 | End: 2025-06-19 | Stop reason: SURG

## 2025-06-19 RX ORDER — BUPIVACAINE HYDROCHLORIDE 5 MG/ML
INJECTION, SOLUTION EPIDURAL; INTRACAUDAL; PERINEURAL AS NEEDED
Status: DISCONTINUED | OUTPATIENT
Start: 2025-06-19 | End: 2025-06-19 | Stop reason: HOSPADM

## 2025-06-19 RX ORDER — HYDROMORPHONE HYDROCHLORIDE 1 MG/ML
0.6 INJECTION, SOLUTION INTRAMUSCULAR; INTRAVENOUS; SUBCUTANEOUS EVERY 5 MIN PRN
Status: DISCONTINUED | OUTPATIENT
Start: 2025-06-19 | End: 2025-06-19

## 2025-06-19 RX ADMIN — GLYCOPYRROLATE 0.2 MG: 0.2 INJECTION, SOLUTION INTRAMUSCULAR; INTRAVENOUS at 07:35:00

## 2025-06-19 RX ADMIN — ONDANSETRON 4 MG: 2 INJECTION INTRAMUSCULAR; INTRAVENOUS at 07:40:00

## 2025-06-19 RX ADMIN — SODIUM CHLORIDE, SODIUM LACTATE, POTASSIUM CHLORIDE, CALCIUM CHLORIDE: 600; 310; 30; 20 INJECTION, SOLUTION INTRAVENOUS at 07:31:00

## 2025-06-19 RX ADMIN — DEXAMETHASONE SODIUM PHOSPHATE 4 MG: 4 MG/ML VIAL (ML) INJECTION at 07:40:00

## 2025-06-19 RX ADMIN — ROCURONIUM BROMIDE 35 MG: 10 INJECTION, SOLUTION INTRAVENOUS at 07:41:00

## 2025-06-19 RX ADMIN — ROCURONIUM BROMIDE 5 MG: 10 INJECTION, SOLUTION INTRAVENOUS at 07:36:00

## 2025-06-19 RX ADMIN — PHENYLEPHRINE HCL 100 MCG: 10 MG/ML VIAL (ML) INJECTION at 07:48:00

## 2025-06-19 RX ADMIN — LIDOCAINE HYDROCHLORIDE 100 MG: 20 INJECTION, SOLUTION EPIDURAL; INFILTRATION; INTRACAUDAL; PERINEURAL at 07:36:00

## 2025-06-19 RX ADMIN — MIDAZOLAM HYDROCHLORIDE 2 MG: 1 INJECTION INTRAMUSCULAR; INTRAVENOUS at 07:31:00

## 2025-06-19 NOTE — ANESTHESIA PREPROCEDURE EVALUATION
Anesthesia PreOp Note    HPI:     Franco Mcnamara is a 55 year old male who presents for preoperative consultation requested by: iRcardo Neves MD    Date of Surgery: 6/19/2025    Procedure(s):  Rectal exam under anesthesia with drainage of perianal abscess  ANAL ABSCESS IRRIGATION & DEBRIDEMENT  Indication: Anal abscess    Relevant Problems   No relevant active problems       NPO:  Last Liquid Consumption Date: 06/18/25  Last Liquid Consumption Time: 1800  Last Solid Consumption Date: 06/18/25  Last Solid Consumption Time: 1800  Last Liquid Consumption Date: 06/18/25          History Review:  Patient Active Problem List    Diagnosis Date Noted    Cellulitis of left upper extremity 09/30/2023    Cat bite, initial encounter 09/30/2023    Neutropenia, unspecified type 09/30/2023    Sepsis, due to unspecified organism, unspecified whether acute organ dysfunction present (HCC) 09/30/2023    Spondylolisthesis at L5-S1 level 07/29/2016    Tobacco use 09/14/2015       Past Medical History[1]    Past Surgical History[2]    Prescriptions Prior to Admission[3]  Current Medications and Prescriptions Ordered in Epic[4]    Allergies[5]    Family History[6]  Social Hx on file[7]    Available pre-op labs reviewed.             Vital Signs:  Body mass index is 30.28 kg/m².   height is 1.778 m (5' 10\") and weight is 95.7 kg (211 lb). His oral temperature is 97.8 °F (36.6 °C). His blood pressure is 146/74 and his pulse is 88. His respiration is 18 and oxygen saturation is 95%.   Vitals:    06/18/25 1006 06/19/25 0637   BP:  146/74   Pulse:  88   Resp:  18   Temp:  97.8 °F (36.6 °C)   TempSrc:  Oral   SpO2:  95%   Weight: 97.5 kg (215 lb) 95.7 kg (211 lb)   Height: 1.778 m (5' 10\") 1.778 m (5' 10\")        Anesthesia Evaluation     Patient summary reviewed and Nursing notes reviewed    Airway   Mallampati: II  TM distance: >3 FB  Neck ROM: full  Dental - Dentition appears grossly intact     Pulmonary - negative ROS and normal exam    Cardiovascular - negative ROS and normal exam    Neuro/Psych - negative ROS     GI/Hepatic/Renal      Endo/Other      Comments: Obese  Abdominal                  Anesthesia Plan:   ASA:  3  Airway:  LMA and ETT  Post-op Pain Management: IV analgesics  Informed Consent Plan and Risks Discussed With:  Patient  Discussed plan with:  Surgeon and CRNA      I have informed Franco Mcnamara and/or legal guardian or family member of the nature of the anesthetic plan, benefits, risks including possible dental damage if relevant, major complications, and any alternative forms of anesthetic management.   All of the patient's questions were answered to the best of my ability. The patient desires the anesthetic management as planned.  Josue Ruby MD  6/19/2025 7:01 AM  Present on Admission:  **None**           [1]   Past Medical History:   Back problem   [2]   Past Surgical History:  Procedure Laterality Date    Appendectomy  1986    Inguinal hernia repair Bilateral 1978    Orbital prosthesis Left 2000    Other surgical history      I&D left wrist d/t cat bite    Repair rotator cuff,acute  1988   [3]   Medications Prior to Admission   Medication Sig Dispense Refill Last Dose/Taking    amoxicillin clavulanate 875-125 MG Oral Tab Take 1 tablet by mouth 2 (two) times daily.   6/19/2025 Morning    HYDROcodone-acetaminophen 5-325 MG Oral Tab Take 1 tablet by mouth every 4 (four) hours as needed. 20 tablet 0 6/19/2025 Morning    diclofenac 75 MG Oral Tab EC Take 1 tablet (75 mg total) by mouth 2 (two) times daily as needed. WITH FOOD   More than a month   [4]   Current Facility-Administered Medications Ordered in Epic   Medication Dose Route Frequency Provider Last Rate Last Admin    lactated ringers infusion   Intravenous Continuous Ricardo Neves MD        acetaminophen (Tylenol Extra Strength) tab 1,000 mg  1,000 mg Oral Once Ricardo Neves MD         No current [facility]-ordered outpatient medications on file.   [5] No Known  Allergies  [6]   Family History  Problem Relation Age of Onset    Heart Disorder Father     Hypertension Mother     Cancer Mother 73        Breast CA   [7]   Social History  Socioeconomic History    Marital status: Single   Tobacco Use    Smoking status: Former     Current packs/day: 0.00     Types: Cigarettes     Quit date:      Years since quittin.4    Smokeless tobacco: Never   Vaping Use    Vaping status: Never Used   Substance and Sexual Activity    Alcohol use: No     Alcohol/week: 0.0 standard drinks of alcohol    Drug use: No    Sexual activity: Never     Partners: Female

## 2025-06-19 NOTE — DISCHARGE INSTRUCTIONS
Follow up    Please call Dr. Neves's office to schedule follow up appointment, [phone], for July 1 or July 2.    Reasons to call office sooner  - Fevers >101.4 or chills,  - Any increasing redness near incisions,  - If pain is not manageable,  - Urge for BM and all that passes is blood    Expectations  It may be difficult to pass urine immediately after anal surgery. If you have not been able to urinate and feel the need, soak in a tub of warm water and relax your mind and muscles and allow yourself to urinate in the water if need be. If you are unable to urinate, you may need to go to Immediate Care and get a urinary catheter placed for a few days.    Some bleeding from wound and following bowel movements is expected for 7-10 days after surgery. Concerning bleeding is if there is an urge to pass stool and only blood is passed, and this happens more than once in 20-30 minutes.    Drainage is expected from open wounds.    Please realize that the use of narcotic pain medication is habit forming and can cause constipation, but make sure that your pain is adequately controlled.  Please do not drive while taking narcotic pain medication.    Please follow these wound care instructions.    -Sitz baths three times daily and after each bowel movement. A Sitz bath uses a portable device (available at most pharmacies) that acts like a bidet    -Dry gauze to wound at all times, change after each Sitz bath (three times per day or more frequently if needed) as follows:     -Separate skin edges of cut to expose raw surface,     -place corner of fully unfolded dry gauze against exposed wound surface,     -remove fingers keeping  skin edges of cut apart and allow edges to collapse against gauze to hold it in place    -tuck remainder of gauze in between buttocks at all times.    -If a drain is in place, locate it and hold it in place before pulling gauze away from the skin.    -Use Miralax 17 gram by mouth once daily  for 2 weeks to soften and gently encourage stools. It is helpful to avoid constipation and passing hard stools.    -No strenuous activity for 2 weeks.     HOME INSTRUCTIONS  AMBSURG HOME CARE INSTRUCTIONS: POST-OP ANESTHESIA  The medication that you received for sedation or general anesthesia can last up to 24 hours. Your judgment and reflexes may be altered, even if you feel like your normal self.      We Recommend:   Do not drive any motor vehicle or bicycle   Avoid mowing the lawn, playing sports, or working with power tools/applicances (power saws, electric knives or mixers)   That you have someone stay with you on your first night home   Do not drink alcohol or take sleeping pills or tranquilizers   Do not sign legal documents within 24 hours of your procedure   If you had a nerve block for your surgery, take extra care not to put any pressure on your arm or hand for 24 hours    It is normal:  For you to have a sore throat if you had a breathing tube during surgery (while you were asleep!). The sore throat should get better within 48 hours. You can gargle with warm salt water (1/2 tsp in 4 oz warm water) or use a throat lozenge for comfort  To feel muscle aches or soreness especially in the abdomen, chest or neck. The achy feeling should go away in the next 24 hours  To feel weak, sleepy or \"wiped out\". Your should start feeling better in the next 24 hours.   To experience mild discomforts such as sore lip or tongue, headache, cramps, gas pains or a bloated feeling in your abdomen.   To experience mild back pain or soreness for a day or two if you had spinal or epidural anesthesia.   If you had laparoscopic surgery, to feel shoulder pain or discomfort on the day of surgery.   For some patients to have nausea after surgery/anesthesia    If you feel nausea or experience vomiting:   Try to move around less.   Eat less than usual or drink only liquids until the next morning   Nausea should resolve in about 24  hours    If you have a problem when you are at home:    Call your surgeons office     Discharge Instructions: After Your Surgery  You’ve just had surgery. During surgery, you were given medicine called anesthesia to keep you relaxed and free of pain. After surgery, you may have some pain or nausea. This is common. Here are some tips for feeling better and getting well after surgery.   Going home  Your healthcare provider will show you how to take care of yourself when you go home. They'll also answer your questions. Have an adult family member or friend drive you home. For the first 24 hours after your surgery:   Don't drive or use heavy equipment.  Don't make important decisions or sign legal papers.  Take medicines as directed.  Don't drink alcohol.  Have someone stay with you, if needed. They can watch for problems and help keep you safe.  Be sure to go to all follow-up visits with your healthcare provider. And rest after your surgery for as long as your provider tells you to.   Coping with pain  If you have pain after surgery, pain medicine will help you feel better. Take it as directed, before pain becomes severe. Also, ask your healthcare provider or pharmacist about other ways to control pain. This might be with heat, ice, or relaxation. And follow any other instructions your surgeon or nurse gives you.      Stay on schedule with your medicine.     Tips for taking pain medicine  To get the best relief possible, remember these points:   Pain medicines can upset your stomach. Taking them with a little food may help.  Most pain relievers taken by mouth need at least 20 to 30 minutes to start to work.  Don't wait till your pain becomes severe before you take your medicine. Try to time your medicine so that you can take it before starting an activity. This might be before you get dressed, go for a walk, or sit down for dinner.  Constipation is a common side effect of some pain medicines. Call your healthcare  provider before taking any medicines, such as laxatives or stool softeners, to help ease constipation. Also ask if you should skip any foods. Drinking lots of fluids and eating foods, such as fruits and vegetables, that are high in fiber can also help. Remember, don't take laxatives unless your surgeon has prescribed them.  Drinking alcohol and taking pain medicine can cause dizziness and slow your breathing. It can even be deadly. Don't drink alcohol while taking pain medicine.  Pain medicine can make you react more slowly to things. Don't drive or run machinery while taking pain medicine.  Your healthcare provider may tell you to take acetaminophen to help ease your pain. Ask them how much you're supposed to take each day. Acetaminophen or other pain relievers may interact with your prescription medicines or other over-the-counter (OTC) medicines. Some prescription medicines have acetaminophen and other ingredients in them. Using both prescription and OTC acetaminophen for pain can cause you to accidentally overdose. Read the labels on your OTC medicines with care. This will help you to clearly know the list of ingredients, how much to take, and any warnings. It may also help you not take too much acetaminophen. If you have questions or don't understand the information, ask your pharmacist or healthcare provider to explain it to you before you take the OTC medicine.   Managing nausea  Some people have an upset stomach (nausea) after surgery. This is often because of anesthesia, pain, or pain medicine, less movement of food in the stomach, or the stress of surgery. These tips will help you handle nausea and eat healthy foods as you get better. If you were on a special food plan before surgery, ask your healthcare provider if you should follow it while you get better. Check with your provider on how your eating should progress. It may depend on the surgery you had. These general tips may help:   Don't push yourself  to eat. Your body will tell you when to eat and how much.  Start off with clear liquids and soup. They're easier to digest.  Next try semi-solid foods as you feel ready. These include mashed potatoes, applesauce, and gelatin.  Slowly move to solid foods. Don’t eat fatty, rich, or spicy foods at first.  Don't force yourself to have 3 large meals a day. Instead eat smaller amounts more often.  Take pain medicines with a small amount of solid food, such as crackers or toast. This helps prevent nausea.  When to call your healthcare provider  Call your healthcare provider right away if you have any of these:   You still have too much pain, or the pain gets worse, after taking the medicine. The medicine may not be strong enough. Or there may be a complication from the surgery.  You feel too sleepy, dizzy, or groggy. The medicine may be too strong.  Side effects, such as nausea or vomiting. Your healthcare provider may advise taking other medicines to treat these or may change your treatment plan..  Skin changes, such as rash, itching, or hives. This may mean you have an allergic reaction. Your provider may advise taking other medicines.  The incision looks different (for instance, part of it opens up).  Bleeding or fluid leaking from the incision site, and you weren't told to expect that.  Fever of 100.4°F (38°C) or higher, or as directed by your healthcare provider.  Call 911  Call 911 right away if you have:   Trouble breathing  Facial swelling    If you have obstructive sleep apnea   You were given anesthesia medicine during surgery to keep you comfortable and free of pain. After surgery, you may have more apnea spells because of this medicine and other medicines you were given. The spells may last longer than normal.    At home:  Keep using the continuous positive airway pressure (CPAP) device when you sleep. Unless your healthcare provider tells you not to, use it when you sleep, day or night. CPAP is a common device  used to treat obstructive sleep apnea.  Talk with your provider before taking any pain medicine, muscle relaxants, or sedatives. Your provider will tell you about the possible dangers of taking these medicines.  Contact your provider if your sleeping changes a lot even when taking medicines as directed.  [employer] last reviewed this educational content on 4/1/2024  This information is for informational purposes only. This is not intended to be a substitute for professional medical advice, diagnosis, or treatment. Always seek the advice and follow the directions from your physician or other qualified health care provider.  © 0606-7424 [employer]. All rights reserved. This information is not intended as a substitute for professional medical care. Always follow your healthcare professional's instructions.

## 2025-06-19 NOTE — DISCHARGE SUMMARY
Outpatient Surgery Brief Discharge Summary         Patient ID:  Franco Mcnamara  [id number]  55 year old  3/9/1970    Admission date:  06/19/25    Discharge date: 06/19/25    Discharge Diagnoses: Anal abscess    Procedures: No discharge procedures on file.    Discharged Condition: stable    Disposition: home    Patient Instructions: Follow-up with Ricardo Neves MD in 1-2 weeks.    Diet: regular diet  Activity: No strenuous activity for 2 weeks.    Ricardo Neves MD, FACS, FASCRS  6/19/2025  8:33 AM

## 2025-06-19 NOTE — OPERATIVE REPORT
Operative Note    Patient Name: Franco Mcnamara    Date of Surgery: 06/19/25    Preoperative Diagnosis: Anal abscess    Postoperative Diagnosis: Anal abscess, right anterior intersphincteric    Primary Surgeon: Ricardo Neves MD    Assistant: RENETTA SANDOVAL    Procedures: EUA, I&D perianal abscess    Surgical Findings:   Spontaneous internal drainage noted at start of examination, localized to right anterior dentate line.  Abscess cavity was primarily intersphincteric with lateral extension but did not appear to be trasnsphincteric    Anesthesia: General    Complications: none    Implants: none    Specimen: none    Drains: 10 Fr Malecot transanal drain placed into internal opening    Condition: good    Estimated Blood Loss: 2 mL    DESCRIPTION OF PROCEDURE    INDICATION   The patient is a 55 year old male with an anal abscess.  He was advised to undergo operative drainage.   The procedure, indications, risks, benefits, and alternatives were discussed with the patient prior to the procedure. All questions were answered, and the patient wished to proceed.    TECHNIQUE  The patient was taken to the operating room and general anesthesia was induced. The patient was then repositioned on the operating table in prone jackknife position with appropriate attention to all joints and pressure points. The buttocks were taped apart and the perianum was prepared with Betadine and draped in the usual sterile fashion. Timeout was called to reconfirm the patient's identity, diagnosis, planned procedure, and completeness of preoperative preparations.    The procedure began with digital inspection. The right anterior abscess appeared smaller than it was 2 days earlier. Compression on the abscess resulted in purulent drainage being released from the anus. Anoscopy with large angled Garrett retractor identified the internal opening at the right anterior dentate line. This was probed to delineate the extent of the cavity. A second smaller  hole was present in the anoderm and the bridge of anoderm between them was laid open to enlarge the drainage site. The cavity was irrigated with saline until clear effluent returned. A 10 Czech Malecot catheter was inserted into the cavity  directed into its lateral extent. The drain was then sewn to the perianum using a silk suture and then trimmed.     The patient tolerated the procedure well. Sponge, needle, instrument counts were correct at the end of the procedure. The patient was allowed to emerge from anesthesia without incident, extubated in the operating room and taken to the recovery room in satisfactory condition.     (Ms. Gold' skilled assistance was necessary for patient positioning, prepping, instrument passing and holding, retraction, and suturing.)    _____________________________________   Attending Surgeon: Ricardo Neves MD   Dictated By: Ricardo Neves MD

## 2025-06-19 NOTE — H&P
[facility]  Preop H&P - Colon and Rectal Surgery  Ricardo Neves MD    CHIEF COMPLAINT: Patient presents with:  Follow - Up: Follow up       HISTORY OF PRESENT ILLNESS:  Franco Mcnamara is a 55 year old male who presents for evaluation of anal pain.    INTERVAL HISTORY  He has been using warm baths and Augmentin for the last 4 days.  Pain and swelling have increased.  He has not had drainage or fevers.    He was given Ultram at last visit but did not tolerate this well.  He experienced nausea and headaches and general ill-feeling.    INITIAL HISTORY (6/13/2025)  Anal complaints:   Anal pain began about 4 days ago, on 6/09/2025, while doing light yard work.  This occurred after an urgent BM.  He noted a lump at the anus yesterday.  He was seen at the [facility] earlier today and a CT obtained.    HISTORY:  Past Medical History:   Diagnosis Date   COVID 02/2023   Tobacco use 09/14/2015     Past Surgical History:   Procedure Laterality Date   APPENDECTOMY 1986   INGUINAL HERNIA REPAIR Bilateral 1978   OPEN REPAIR OF ROTATOR CUFF ACUTE 1988   ORBITAL PROSTHESIS Left 2000     Family History   Problem Relation Age of Onset   Heart Disorder Father   Hypertension Mother   Cancer Mother 73   Breast CA     Social History  Tobacco Use  Smoking status: Former  Packs/day: 1.00  Types: Cigarettes  Smokeless tobacco: Never  Vaping Use  Vaping status: Never Used  Alcohol use: No  Alcohol/week: 0.0 standard drinks of alcohol  Drug use: No      CURRENT MEDICATIONS:   Current Outpatient Medications   Medication Sig Dispense Refill   HYDROcodone-acetaminophen 5-325 MG Oral Tab Take 1 tablet by mouth every 4 (four) hours as needed for Pain. 30 tablet 0   amoxicillin clavulanate 875-125 MG Oral Tab Take 1 tablet by mouth 2 (two) times daily for 10 days. 20 tablet 0   traMADol 50 MG Oral Tab Take 1 tablet (50 mg total) by mouth every 6 (six) hours as needed for Pain. (Patient not taking: Reported on 6/17/2025) 30 tablet 0   diclofenac 75 MG  Oral Tab EC Take 1 tablet (75 mg total) by mouth 2 (two) times daily as needed (Pain). Take with food 60 tablet 0         ALLERGIES:  Patient has no known allergies.    REVIEW OF SYSTEMS:  Constitutional: normal  Eyes: normal  Ears/Nose/Throat: normal  Respiratory: normal  Cardiac/Vascular: normal  GI: see HPI  : normal  Musculoskeletal: normal  Skin: normal  Neurologic: normal  Psychiatric: normal  Endocrine: normal  Hematology/Lymphatics: normal  Allergy/Immunology: normal    PHYSICAL EXAM:   Ht 5' 10\" (1.778 m)  Wt 216 lb (98 kg)  BMI 30.99 kg/m²   Body mass index is 30.99 kg/m².    CONSTITUTIONAL: awake, alert, cooperative, no apparent distress, and appears stated age  EYES: Lids and lashes normal, sclera clear, conjunctiva normal  ENT: Normocephalic, without obvious abnormality, atraumatic  NECK: Supple, symmetrical, trachea midline, no adenopathy, thyroid symmetric, not enlarged and no tenderness  HEMATOLOGIC/LYMPHATICS: No cervical lymphadenopathy, no supraclavicular lymphadenopathy, no inguinal lymphadenopathy  LUNGS: No increased work of breathing, good air exchange, clear to auscultation bilaterally, no crackles or wheezing  CARDIOVASCULAR: Normal apical impulse, regular rate and rhythm, and no murmur noted, no pedal edema    RECTAL:  External skin is visually normal  There is subtle induration, about 2 cm diameter at right anterior anal verge  This is moderately tender, there is no fluctuance or drainage  Anal tags are not noted  Prolapse is not noted with straining  Resting tone is normal  Squeeze tone is normal  Mass is not palpable    GENITOURINARY:  Prostate moderate, smooth    MUSCULOSKELETAL: Full range of motion noted. Motor strength is 5 out of 5 all extremities bilaterally.   SKIN: no rashes and no jaundice  PSYCHIATRIC:   Orientation: normal  Appearance: normal  Behavior: normal  Attitude toward examiner: normal  Affect: normal  Judgment: Normal    DATA:   CT A/P  6/13/2025  FINDINGS:  ...  KIDNEYS/URETERS: Heterogenous solid and cystic right interpolar and lower pole mass measures 7.8 cm  AP by 6.0 cm TV by 7.0 cm cc (series 4 image 87). This does not involve the collecting system or  renal vasculature, and is only 3 mm from the posterior renal fascia (series 4 image 86). The left  kidney is normal.  ...  LYMPH NODES: No retroperitoneal or pelvic lymphadenopathy.  GASTROINTESTINAL TRACT: No bowel obstruction or inflammation. Partially imaged on this exam is a  peripherally enhancing right perianal fluid collection measuring 1.5 x 1.5 cm (series 4 image 187),  compatible with a perianal abscess.  ...   Impression   IMPRESSION:  1. A 7.8 cm right renal mass highly suspicious for renal cell carcinoma. Urologic consultation  recommended.  2. A 1.5 cm right perianal abscess.  3. Splenomegaly of uncertain etiology  4. Chronic pars defects at L5 with 5 mm anterolisthesis of L5 on S1.     ASSESSMENT AND PLAN:  Anal abscess  Not responsive to antibiotics  Appears to have matured into a drainable abscess although this is deep and not amenable to office drainage.    Renal mass, right  He has seen his PCP and Urology consultation initiated    Ultram intolerance  Switch to Norco    Advise  -EUA, I&D abscess, placement of Malecot drain or seton  Schedule ASAP, next 24-48 hours    Procedure, indications, risks, benefits, and alternatives discussed with patient. All questions answered. He understands and he wishes to proceed.    Prior A/P  Anal abscess  Does not appear mature or drainable by CT or exam    Renal mass, right  Incidental finding on CT  Will need Urology consultation    Advise  -Warm baths  -Augmentin  -Ultram for pain control  -RTC next office hours, 6/17, assess response to antibiotics or need/readiness for drainage    Diagnoses and all orders for this visit:      The patient was educated regarding the condition, treatment options, and instructed in the above treatment  plan.    Ricardo Neves MD, MD, FACS, FASCRS

## 2025-06-19 NOTE — H&P (VIEW-ONLY)
[facility]  Preop H&P - Colon and Rectal Surgery  Ricardo Neves MD    CHIEF COMPLAINT: Patient presents with:  Follow - Up: Follow up       HISTORY OF PRESENT ILLNESS:  Franco Mcnamara is a 55 year old male who presents for evaluation of anal pain.    INTERVAL HISTORY  He has been using warm baths and Augmentin for the last 4 days.  Pain and swelling have increased.  He has not had drainage or fevers.    He was given Ultram at last visit but did not tolerate this well.  He experienced nausea and headaches and general ill-feeling.    INITIAL HISTORY (6/13/2025)  Anal complaints:   Anal pain began about 4 days ago, on 6/09/2025, while doing light yard work.  This occurred after an urgent BM.  He noted a lump at the anus yesterday.  He was seen at the [facility] earlier today and a CT obtained.    HISTORY:  Past Medical History:   Diagnosis Date   COVID 02/2023   Tobacco use 09/14/2015     Past Surgical History:   Procedure Laterality Date   APPENDECTOMY 1986   INGUINAL HERNIA REPAIR Bilateral 1978   OPEN REPAIR OF ROTATOR CUFF ACUTE 1988   ORBITAL PROSTHESIS Left 2000     Family History   Problem Relation Age of Onset   Heart Disorder Father   Hypertension Mother   Cancer Mother 73   Breast CA     Social History  Tobacco Use  Smoking status: Former  Packs/day: 1.00  Types: Cigarettes  Smokeless tobacco: Never  Vaping Use  Vaping status: Never Used  Alcohol use: No  Alcohol/week: 0.0 standard drinks of alcohol  Drug use: No      CURRENT MEDICATIONS:   Current Outpatient Medications   Medication Sig Dispense Refill   HYDROcodone-acetaminophen 5-325 MG Oral Tab Take 1 tablet by mouth every 4 (four) hours as needed for Pain. 30 tablet 0   amoxicillin clavulanate 875-125 MG Oral Tab Take 1 tablet by mouth 2 (two) times daily for 10 days. 20 tablet 0   traMADol 50 MG Oral Tab Take 1 tablet (50 mg total) by mouth every 6 (six) hours as needed for Pain. (Patient not taking: Reported on 6/17/2025) 30 tablet 0   diclofenac 75 MG  Oral Tab EC Take 1 tablet (75 mg total) by mouth 2 (two) times daily as needed (Pain). Take with food 60 tablet 0         ALLERGIES:  Patient has no known allergies.    REVIEW OF SYSTEMS:  Constitutional: normal  Eyes: normal  Ears/Nose/Throat: normal  Respiratory: normal  Cardiac/Vascular: normal  GI: see HPI  : normal  Musculoskeletal: normal  Skin: normal  Neurologic: normal  Psychiatric: normal  Endocrine: normal  Hematology/Lymphatics: normal  Allergy/Immunology: normal    PHYSICAL EXAM:   Ht 5' 10\" (1.778 m)  Wt 216 lb (98 kg)  BMI 30.99 kg/m²   Body mass index is 30.99 kg/m².    CONSTITUTIONAL: awake, alert, cooperative, no apparent distress, and appears stated age  EYES: Lids and lashes normal, sclera clear, conjunctiva normal  ENT: Normocephalic, without obvious abnormality, atraumatic  NECK: Supple, symmetrical, trachea midline, no adenopathy, thyroid symmetric, not enlarged and no tenderness  HEMATOLOGIC/LYMPHATICS: No cervical lymphadenopathy, no supraclavicular lymphadenopathy, no inguinal lymphadenopathy  LUNGS: No increased work of breathing, good air exchange, clear to auscultation bilaterally, no crackles or wheezing  CARDIOVASCULAR: Normal apical impulse, regular rate and rhythm, and no murmur noted, no pedal edema    RECTAL:  External skin is visually normal  There is subtle induration, about 2 cm diameter at right anterior anal verge  This is moderately tender, there is no fluctuance or drainage  Anal tags are not noted  Prolapse is not noted with straining  Resting tone is normal  Squeeze tone is normal  Mass is not palpable    GENITOURINARY:  Prostate moderate, smooth    MUSCULOSKELETAL: Full range of motion noted. Motor strength is 5 out of 5 all extremities bilaterally.   SKIN: no rashes and no jaundice  PSYCHIATRIC:   Orientation: normal  Appearance: normal  Behavior: normal  Attitude toward examiner: normal  Affect: normal  Judgment: Normal    DATA:   CT A/P  6/13/2025  FINDINGS:  ...  KIDNEYS/URETERS: Heterogenous solid and cystic right interpolar and lower pole mass measures 7.8 cm  AP by 6.0 cm TV by 7.0 cm cc (series 4 image 87). This does not involve the collecting system or  renal vasculature, and is only 3 mm from the posterior renal fascia (series 4 image 86). The left  kidney is normal.  ...  LYMPH NODES: No retroperitoneal or pelvic lymphadenopathy.  GASTROINTESTINAL TRACT: No bowel obstruction or inflammation. Partially imaged on this exam is a  peripherally enhancing right perianal fluid collection measuring 1.5 x 1.5 cm (series 4 image 187),  compatible with a perianal abscess.  ...   Impression   IMPRESSION:  1. A 7.8 cm right renal mass highly suspicious for renal cell carcinoma. Urologic consultation  recommended.  2. A 1.5 cm right perianal abscess.  3. Splenomegaly of uncertain etiology  4. Chronic pars defects at L5 with 5 mm anterolisthesis of L5 on S1.     ASSESSMENT AND PLAN:  Anal abscess  Not responsive to antibiotics  Appears to have matured into a drainable abscess although this is deep and not amenable to office drainage.    Renal mass, right  He has seen his PCP and Urology consultation initiated    Ultram intolerance  Switch to Norco    Advise  -EUA, I&D abscess, placement of Malecot drain or seton  Schedule ASAP, next 24-48 hours    Procedure, indications, risks, benefits, and alternatives discussed with patient. All questions answered. He understands and he wishes to proceed.    Prior A/P  Anal abscess  Does not appear mature or drainable by CT or exam    Renal mass, right  Incidental finding on CT  Will need Urology consultation    Advise  -Warm baths  -Augmentin  -Ultram for pain control  -RTC next office hours, 6/17, assess response to antibiotics or need/readiness for drainage    Diagnoses and all orders for this visit:      The patient was educated regarding the condition, treatment options, and instructed in the above treatment  plan.    Ricardo Neves MD, MD, FACS, FASCRS

## 2025-06-19 NOTE — ANESTHESIA POSTPROCEDURE EVALUATION
Patient: Franco Mcnamara    Procedure Summary       Date: 06/19/25 Room / Location: [facility] MAIN OR  / [facility] MAIN OR    Anesthesia Start: 0731 Anesthesia Stop:     Procedures:       Rectal exam under anesthesia with drainage of perianal abscess (Anus)      ANAL ABSCESS IRRIGATION & DEBRIDEMENT (Anus) Diagnosis: (Anal abscess)    Surgeons: Ricardo Neves MD Anesthesiologist: Josue Ruby MD    Anesthesia Type: Not recorded ASA Status: 3            Anesthesia Type: No value filed.    Vitals Value Taken Time   /81 06/19/25 08:32   Temp 97.9 06/19/25 08:34   Pulse 87 06/19/25 08:33   Resp 16 06/19/25 08:33   SpO2 96 % 06/19/25 08:33   Vitals shown include unfiled device data.    [facility] AN Post Evaluation:   Patient Evaluated in PACU  Patient Participation: complete - patient participated  Level of Consciousness: awake and alert  Pain Score: 0  Pain Management: adequate  Airway Patency:patent  Dental exam unchanged from preop  Yes    Nausea/Vomiting: none  Cardiovascular Status: acceptable  Respiratory Status: acceptable  Postoperative Hydration acceptable      Nusrat Daniels CRNA  6/19/2025 8:34 AM

## 2025-06-19 NOTE — ANESTHESIA PROCEDURE NOTES
Airway  Date/Time: 6/19/2025 7:39 AM  Reason: Elective      General Information and Staff   Patient location during procedure: OR  Anesthesiologist: Josue Ruby MD  Resident/CRNA: Nusrat Daniels CRNA  Performed: CRNA   Performed by: Nusrat Daniels CRNA  Authorized by: Josue Ruby MD        Indications and Patient Condition  Indications for airway management: anesthesia  Sedation level: deep      Preoxygenated: yesPatient position: sniffing    Mask difficulty assessment: 0 - not attempted    Final Airway Details    Final airway type: endotracheal airway    Successful airway: ETT  Cuffed: yes   Successful intubation technique: Video laryngoscopy  Endotracheal tube insertion site: oral  Blade: GlideScope  Blade size: #4  ETT size (mm): 8.0    Cormack-Lehane Classification: grade I - full view of glottis  Placement verified by: capnometry   Measured from: teeth  ETT to teeth (cm): 19  Number of attempts at approach: 1    Additional Comments  Easy, atraumatic intubation. Dentition and lips unchanged.

## 2025-06-22 ENCOUNTER — HOSPITAL ENCOUNTER (EMERGENCY)
Facility: HOSPITAL | Age: 55
Discharge: HOME | End: 2025-06-22
Attending: EMERGENCY MEDICINE
Payer: COMMERCIAL

## 2025-06-22 VITALS
SYSTOLIC BLOOD PRESSURE: 125 MMHG | BODY MASS INDEX: 30.78 KG/M2 | DIASTOLIC BLOOD PRESSURE: 67 MMHG | TEMPERATURE: 97 F | RESPIRATION RATE: 22 BRPM | HEIGHT: 70 IN | WEIGHT: 215 LBS | OXYGEN SATURATION: 100 % | HEART RATE: 93 BPM

## 2025-06-22 DIAGNOSIS — G89.18 POST-OPERATIVE PAIN: Primary | ICD-10-CM

## 2025-06-22 PROCEDURE — 99283 EMERGENCY DEPT VISIT LOW MDM: CPT

## 2025-06-22 PROCEDURE — 99284 EMERGENCY DEPT VISIT MOD MDM: CPT

## 2025-06-22 RX ORDER — HYDROCODONE BITARTRATE AND ACETAMINOPHEN 5; 325 MG/1; MG/1
1-2 TABLET ORAL EVERY 6 HOURS PRN
Qty: 10 TABLET | Refills: 0 | Status: SHIPPED | OUTPATIENT
Start: 2025-06-22 | End: 2025-06-27

## 2025-06-22 NOTE — ED PROVIDER NOTES
Patient Seen in: [facility] Emergency Department        History  Chief Complaint   Patient presents with    Rectal Problem     Stated Complaint: rectal abscess surgery done at [place], drain tube fell out    Subjective:   HPI            55-year-old man presents with rectal pain.  Had a rectal I&D done at [facility].  Drain placed.  Friday the drain fell out spontaneously.  He has been having persistent pain predominantly around the suture site since the drain fell out.  No fevers.        Objective:     No pertinent past medical history.            No pertinent past surgical history.              No pertinent social history.                              Physical Exam    ED Triage Vitals   BP 06/22/25 1228 125/67   Pulse 06/22/25 1215 104   Resp 06/22/25 1215 22   Temp 06/22/25 1215 96.6 °F (35.9 °C)   Temp src 06/22/25 1215 Temporal   SpO2 06/22/25 1215 96 %   O2 Device 06/22/25 1215 None (Room air)       Current Vitals:   Vital Signs  BP: 125/67  Pulse: 93  Resp: 22  Temp: 96.6 °F (35.9 °C)  Temp src: Temporal    Oxygen Therapy  SpO2: 100 %  O2 Device: None (Room air)            Physical Exam  Constitutional:       General: Is not in acute distress.     Appearance: Is not ill-appearing.   HENT:      Head: Normocephalic and atraumatic.      Mouth/Throat:      Mouth: Mucous membranes are moist.   Eyes:      Conjunctiva/sclera: Conjunctivae normal.      Pupils: Pupils are equal, round, and reactive to light.   Cardiovascular:      Rate and Rhythm: Normal rate and regular rhythm.   Pulmonary:      Effort: Pulmonary effort is normal. No respiratory distress.      Breath sounds: Normal breath sounds.   Abdominal:      General: Abdomen is flat. There is no distension.      Tenderness: There is no abdominal tenderness.   Rectal examination notable for drain hanging by suture thread, removed.  No fluctuance.  No erythema.  No discharge.  Surgical site is C/D/I  Musculoskeletal:      Right lower leg: No edema.       Left lower leg: No edema.   Skin:     General: Skin is warm and dry.   Neurological:      General: No focal deficit present.      Mental Status: Is alert.           ED Course  Labs Reviewed - No data to display                         MDM     Considered all emergent etiologies, differential includes but is not limited to: Cellulitis, recurrent abscess, suture site pain     I have independently visualized the radiology images, my focus/limited interpretation: N/A     Defer to radiologist for other/incidental findings    Exam with no external evidence of cellulitis or abscess recurrence at this time.  Pain seems to mostly be localized around the suture anchor site for the drain.  Suture removed and drain removed.  Advise close follow-up with his surgeon as abscess recurrence is common although there does not seem to be any signs of this currently.  Did offer CT for internal imaging to evaluate for abscess recurrence though do suspect that it would be too early.  Will discharge with additional Norco's as he is currently out.  He is still on Augmentin at this time.  He is a planning to call his surgeon tomorrow to schedule follow-up        Medical Decision Making      Disposition and Plan     Clinical Impression:  1. Post-operative pain         Disposition:  Discharge  6/22/2025  1:07 pm    Follow-up:  Ricardo Neves MD  [address] [facility]  [address] [place] [id number]  [phone]    Follow up            Medications Prescribed:  Current Discharge Medication List        START taking these medications    Details   !! HYDROcodone-acetaminophen 5-325 MG Oral Tab Take 1-2 tablets by mouth every 6 (six) hours as needed for Pain.  Qty: 10 tablet, Refills: 0    Associated Diagnoses: Post-operative pain       !! - Potential duplicate medications found. Please discuss with provider.                Supplementary Documentation:

## 2025-06-22 NOTE — ED INITIAL ASSESSMENT (HPI)
Pt with recent I&D of rectal abscess, drain fell out Friday & pt states it now feels like \"the stitches are pulling out\". Unable to sit during triage d/t pain.

## 2025-07-02 ENCOUNTER — ANESTHESIA (OUTPATIENT)
Dept: SURGERY | Facility: HOSPITAL | Age: 55
End: 2025-07-02
Payer: COMMERCIAL

## 2025-07-02 ENCOUNTER — ANESTHESIA EVENT (OUTPATIENT)
Dept: SURGERY | Facility: HOSPITAL | Age: 55
End: 2025-07-02
Payer: COMMERCIAL

## 2025-07-02 ENCOUNTER — HOSPITAL ENCOUNTER (OUTPATIENT)
Facility: HOSPITAL | Age: 55
Setting detail: HOSPITAL OUTPATIENT SURGERY
Discharge: HOME | End: 2025-07-02
Attending: COLON & RECTAL SURGERY | Admitting: COLON & RECTAL SURGERY

## 2025-07-02 VITALS
WEIGHT: 202 LBS | BODY MASS INDEX: 28.92 KG/M2 | HEIGHT: 70 IN | RESPIRATION RATE: 16 BRPM | HEART RATE: 83 BPM | DIASTOLIC BLOOD PRESSURE: 63 MMHG | TEMPERATURE: 97 F | OXYGEN SATURATION: 98 % | SYSTOLIC BLOOD PRESSURE: 112 MMHG

## 2025-07-02 RX ORDER — MORPHINE SULFATE 10 MG/ML
6 INJECTION, SOLUTION INTRAMUSCULAR; INTRAVENOUS EVERY 10 MIN PRN
Status: DISCONTINUED | OUTPATIENT
Start: 2025-07-02 | End: 2025-07-02

## 2025-07-02 RX ORDER — ONDANSETRON 2 MG/ML
4 INJECTION INTRAMUSCULAR; INTRAVENOUS EVERY 6 HOURS PRN
Status: DISCONTINUED | OUTPATIENT
Start: 2025-07-02 | End: 2025-07-02

## 2025-07-02 RX ORDER — MIDAZOLAM HYDROCHLORIDE 1 MG/ML
INJECTION INTRAMUSCULAR; INTRAVENOUS AS NEEDED
Status: DISCONTINUED | OUTPATIENT
Start: 2025-07-02 | End: 2025-07-02 | Stop reason: SURG

## 2025-07-02 RX ORDER — FAMOTIDINE 20 MG/1
20 TABLET, FILM COATED ORAL ONCE
Status: COMPLETED | OUTPATIENT
Start: 2025-07-02 | End: 2025-07-02

## 2025-07-02 RX ORDER — HYDROCODONE BITARTRATE AND ACETAMINOPHEN 5; 325 MG/1; MG/1
1 TABLET ORAL ONCE
Status: COMPLETED | OUTPATIENT
Start: 2025-07-02 | End: 2025-07-02

## 2025-07-02 RX ORDER — PROCHLORPERAZINE EDISYLATE 5 MG/ML
5 INJECTION INTRAMUSCULAR; INTRAVENOUS EVERY 8 HOURS PRN
Status: DISCONTINUED | OUTPATIENT
Start: 2025-07-02 | End: 2025-07-02

## 2025-07-02 RX ORDER — MORPHINE SULFATE 4 MG/ML
4 INJECTION, SOLUTION INTRAMUSCULAR; INTRAVENOUS EVERY 10 MIN PRN
Status: DISCONTINUED | OUTPATIENT
Start: 2025-07-02 | End: 2025-07-02

## 2025-07-02 RX ORDER — HYDROMORPHONE HYDROCHLORIDE 1 MG/ML
0.6 INJECTION, SOLUTION INTRAMUSCULAR; INTRAVENOUS; SUBCUTANEOUS EVERY 5 MIN PRN
Status: DISCONTINUED | OUTPATIENT
Start: 2025-07-02 | End: 2025-07-02

## 2025-07-02 RX ORDER — SODIUM CHLORIDE, SODIUM LACTATE, POTASSIUM CHLORIDE, CALCIUM CHLORIDE 600; 310; 30; 20 MG/100ML; MG/100ML; MG/100ML; MG/100ML
INJECTION, SOLUTION INTRAVENOUS CONTINUOUS
Status: DISCONTINUED | OUTPATIENT
Start: 2025-07-02 | End: 2025-07-02

## 2025-07-02 RX ORDER — ROCURONIUM BROMIDE 10 MG/ML
INJECTION, SOLUTION INTRAVENOUS AS NEEDED
Status: DISCONTINUED | OUTPATIENT
Start: 2025-07-02 | End: 2025-07-02 | Stop reason: SURG

## 2025-07-02 RX ORDER — METOCLOPRAMIDE HYDROCHLORIDE 5 MG/ML
10 INJECTION INTRAMUSCULAR; INTRAVENOUS ONCE
Status: COMPLETED | OUTPATIENT
Start: 2025-07-02 | End: 2025-07-02

## 2025-07-02 RX ORDER — ONDANSETRON 2 MG/ML
INJECTION INTRAMUSCULAR; INTRAVENOUS AS NEEDED
Status: DISCONTINUED | OUTPATIENT
Start: 2025-07-02 | End: 2025-07-02 | Stop reason: SURG

## 2025-07-02 RX ORDER — HYDROMORPHONE HYDROCHLORIDE 1 MG/ML
0.2 INJECTION, SOLUTION INTRAMUSCULAR; INTRAVENOUS; SUBCUTANEOUS EVERY 5 MIN PRN
Status: DISCONTINUED | OUTPATIENT
Start: 2025-07-02 | End: 2025-07-02

## 2025-07-02 RX ORDER — BUPIVACAINE HYDROCHLORIDE 2.5 MG/ML
INJECTION, SOLUTION EPIDURAL; INFILTRATION; INTRACAUDAL; PERINEURAL AS NEEDED
Status: DISCONTINUED | OUTPATIENT
Start: 2025-07-02 | End: 2025-07-02 | Stop reason: HOSPADM

## 2025-07-02 RX ORDER — HYDROMORPHONE HYDROCHLORIDE 1 MG/ML
0.4 INJECTION, SOLUTION INTRAMUSCULAR; INTRAVENOUS; SUBCUTANEOUS EVERY 5 MIN PRN
Status: DISCONTINUED | OUTPATIENT
Start: 2025-07-02 | End: 2025-07-02

## 2025-07-02 RX ORDER — MORPHINE SULFATE 4 MG/ML
2 INJECTION, SOLUTION INTRAMUSCULAR; INTRAVENOUS EVERY 10 MIN PRN
Status: DISCONTINUED | OUTPATIENT
Start: 2025-07-02 | End: 2025-07-02

## 2025-07-02 RX ORDER — NALOXONE HYDROCHLORIDE 0.4 MG/ML
0.08 INJECTION, SOLUTION INTRAMUSCULAR; INTRAVENOUS; SUBCUTANEOUS AS NEEDED
Status: DISCONTINUED | OUTPATIENT
Start: 2025-07-02 | End: 2025-07-02

## 2025-07-02 RX ORDER — METOCLOPRAMIDE 10 MG/1
10 TABLET ORAL ONCE
Status: COMPLETED | OUTPATIENT
Start: 2025-07-02 | End: 2025-07-02

## 2025-07-02 RX ORDER — FAMOTIDINE 10 MG/ML
20 INJECTION, SOLUTION INTRAVENOUS ONCE
Status: COMPLETED | OUTPATIENT
Start: 2025-07-02 | End: 2025-07-02

## 2025-07-02 RX ORDER — DEXAMETHASONE SODIUM PHOSPHATE 4 MG/ML
VIAL (ML) INJECTION AS NEEDED
Status: DISCONTINUED | OUTPATIENT
Start: 2025-07-02 | End: 2025-07-02 | Stop reason: SURG

## 2025-07-02 RX ORDER — LIDOCAINE HYDROCHLORIDE 10 MG/ML
INJECTION, SOLUTION EPIDURAL; INFILTRATION; INTRACAUDAL; PERINEURAL AS NEEDED
Status: DISCONTINUED | OUTPATIENT
Start: 2025-07-02 | End: 2025-07-02 | Stop reason: SURG

## 2025-07-02 RX ORDER — ACETAMINOPHEN 500 MG
1000 TABLET ORAL ONCE
Status: COMPLETED | OUTPATIENT
Start: 2025-07-02 | End: 2025-07-02

## 2025-07-02 RX ADMIN — DEXAMETHASONE SODIUM PHOSPHATE 4 MG: 4 MG/ML VIAL (ML) INJECTION at 11:30:00

## 2025-07-02 RX ADMIN — ONDANSETRON 4 MG: 2 INJECTION INTRAMUSCULAR; INTRAVENOUS at 11:30:00

## 2025-07-02 RX ADMIN — ROCURONIUM BROMIDE 80 MG: 10 INJECTION, SOLUTION INTRAVENOUS at 11:02:00

## 2025-07-02 RX ADMIN — MIDAZOLAM HYDROCHLORIDE 2 MG: 1 INJECTION INTRAMUSCULAR; INTRAVENOUS at 10:59:00

## 2025-07-02 RX ADMIN — LIDOCAINE HYDROCHLORIDE 50 MG: 10 INJECTION, SOLUTION EPIDURAL; INFILTRATION; INTRACAUDAL; PERINEURAL at 11:00:00

## 2025-07-02 RX ADMIN — SODIUM CHLORIDE, SODIUM LACTATE, POTASSIUM CHLORIDE, CALCIUM CHLORIDE: 600; 310; 30; 20 INJECTION, SOLUTION INTRAVENOUS at 12:10:00

## 2025-07-02 NOTE — DISCHARGE INSTRUCTIONS
Follow up    Please call Dr. Neves's office to schedule follow up appointment, [phone].    Reasons to call office sooner  - Fevers >101.4 or chills,  - Any increasing redness near incisions,  - If pain is not manageable,  - Urge for BM and all that passes is blood    Expectations  It may be difficult to pass urine immediately after anal surgery. If you have not been able to urinate and feel the need, soak in a tub of warm water and relax your mind and muscles and allow yourself to urinate in the water if need be. If you are unable to urinate, you may need to go to Immediate Care and get a urinary catheter placed for a few days.    Some bleeding from wound and following bowel movements is expected for 7-10 days after surgery. Concerning bleeding is if there is an urge to pass stool and only blood is passed, and this happens more than once in 20-30 minutes.    Drainage is expected from open wounds.    Please realize that the use of narcotic pain medication is habit forming and can cause constipation, but make sure that your pain is adequately controlled.  Please do not drive while taking narcotic pain medication.    Please follow these wound care instructions.    -Sitz baths three times daily and after each bowel movement. A Sitz bath uses a portable device (available at most pharmacies) that acts like a bidet    -Dry gauze to wound at all times, change after each Sitz bath (three times per day or more frequently if needed) as follows:     -Separate skin edges of cut to expose raw surface,     -place corner of fully unfolded dry gauze against exposed wound surface,     -remove fingers keeping  skin edges of cut apart and allow edges to collapse against gauze to hold it in place    -tuck remainder of gauze in between buttocks at all times.    -If a drain is in place, locate it and hold it in place before pulling gauze away from the skin.    -Use Miralax 17 gram by mouth once daily for 2 weeks to soften  and gently encourage stools. It is helpful to avoid constipation and passing hard stools.    -No strenuous activity for 3 weeks.     HOME INSTRUCTIONS  AMBSURG HOME CARE INSTRUCTIONS: POST-OP ANESTHESIA  The medication that you received for sedation or general anesthesia can last up to 24 hours. Your judgment and reflexes may be altered, even if you feel like your normal self.      We Recommend:   Do not drive any motor vehicle or bicycle   Avoid mowing the lawn, playing sports, or working with power tools/applicances (power saws, electric knives or mixers)   That you have someone stay with you on your first night home   Do not drink alcohol or take sleeping pills or tranquilizers   Do not sign legal documents within 24 hours of your procedure   If you had a nerve block for your surgery, take extra care not to put any pressure on your arm or hand for 24 hours    It is normal:  For you to have a sore throat if you had a breathing tube during surgery (while you were asleep!). The sore throat should get better within 48 hours. You can gargle with warm salt water (1/2 tsp in 4 oz warm water) or use a throat lozenge for comfort  To feel muscle aches or soreness especially in the abdomen, chest or neck. The achy feeling should go away in the next 24 hours  To feel weak, sleepy or \"wiped out\". Your should start feeling better in the next 24 hours.   To experience mild discomforts such as sore lip or tongue, headache, cramps, gas pains or a bloated feeling in your abdomen.   To experience mild back pain or soreness for a day or two if you had spinal or epidural anesthesia.   If you had laparoscopic surgery, to feel shoulder pain or discomfort on the day of surgery.   For some patients to have nausea after surgery/anesthesia    If you feel nausea or experience vomiting:   Try to move around less.   Eat less than usual or drink only liquids until the next morning   Nausea should resolve in about 24 hours    If you have a  problem when you are at home:    Call your surgeons office       Discharge Instructions: After Your Surgery  You’ve just had surgery. During surgery, you were given medicine called anesthesia to keep you relaxed and free of pain. After surgery, you may have some pain or nausea. This is common. Here are some tips for feeling better and getting well after surgery.   Going home  Your healthcare provider will show you how to take care of yourself when you go home. They'll also answer your questions. Have an adult family member or friend drive you home. For the first 24 hours after your surgery:   Don't drive or use heavy equipment.  Don't make important decisions or sign legal papers.  Take medicines as directed.  Don't drink alcohol.  Have someone stay with you, if needed. They can watch for problems and help keep you safe.  Be sure to go to all follow-up visits with your healthcare provider. And rest after your surgery for as long as your provider tells you to.   Coping with pain  If you have pain after surgery, pain medicine will help you feel better. Take it as directed, before pain becomes severe. Also, ask your healthcare provider or pharmacist about other ways to control pain. This might be with heat, ice, or relaxation. And follow any other instructions your surgeon or nurse gives you.      Stay on schedule with your medicine.     Tips for taking pain medicine  To get the best relief possible, remember these points:   Pain medicines can upset your stomach. Taking them with a little food may help.  Most pain relievers taken by mouth need at least 20 to 30 minutes to start to work.  Don't wait till your pain becomes severe before you take your medicine. Try to time your medicine so that you can take it before starting an activity. This might be before you get dressed, go for a walk, or sit down for dinner.  Constipation is a common side effect of some pain medicines. Call your healthcare provider before taking any  medicines, such as laxatives or stool softeners, to help ease constipation. Also ask if you should skip any foods. Drinking lots of fluids and eating foods, such as fruits and vegetables, that are high in fiber can also help. Remember, don't take laxatives unless your surgeon has prescribed them.  Drinking alcohol and taking pain medicine can cause dizziness and slow your breathing. It can even be deadly. Don't drink alcohol while taking pain medicine.  Pain medicine can make you react more slowly to things. Don't drive or run machinery while taking pain medicine.  Your healthcare provider may tell you to take acetaminophen to help ease your pain. Ask them how much you're supposed to take each day. Acetaminophen or other pain relievers may interact with your prescription medicines or other over-the-counter (OTC) medicines. Some prescription medicines have acetaminophen and other ingredients in them. Using both prescription and OTC acetaminophen for pain can cause you to accidentally overdose. Read the labels on your OTC medicines with care. This will help you to clearly know the list of ingredients, how much to take, and any warnings. It may also help you not take too much acetaminophen. If you have questions or don't understand the information, ask your pharmacist or healthcare provider to explain it to you before you take the OTC medicine.   Managing nausea  Some people have an upset stomach (nausea) after surgery. This is often because of anesthesia, pain, or pain medicine, less movement of food in the stomach, or the stress of surgery. These tips will help you handle nausea and eat healthy foods as you get better. If you were on a special food plan before surgery, ask your healthcare provider if you should follow it while you get better. Check with your provider on how your eating should progress. It may depend on the surgery you had. These general tips may help:   Don't push yourself to eat. Your body will tell  you when to eat and how much.  Start off with clear liquids and soup. They're easier to digest.  Next try semi-solid foods as you feel ready. These include mashed potatoes, applesauce, and gelatin.  Slowly move to solid foods. Don’t eat fatty, rich, or spicy foods at first.  Don't force yourself to have 3 large meals a day. Instead eat smaller amounts more often.  Take pain medicines with a small amount of solid food, such as crackers or toast. This helps prevent nausea.  When to call your healthcare provider  Call your healthcare provider right away if you have any of these:   You still have too much pain, or the pain gets worse, after taking the medicine. The medicine may not be strong enough. Or there may be a complication from the surgery.  You feel too sleepy, dizzy, or groggy. The medicine may be too strong.  Side effects, such as nausea or vomiting. Your healthcare provider may advise taking other medicines to treat these or may change your treatment plan..  Skin changes, such as rash, itching, or hives. This may mean you have an allergic reaction. Your provider may advise taking other medicines.  The incision looks different (for instance, part of it opens up).  Bleeding or fluid leaking from the incision site, and you weren't told to expect that.  Fever of 100.4°F (38°C) or higher, or as directed by your healthcare provider.  Call 911  Call 911 right away if you have:   Trouble breathing  Facial swelling    If you have obstructive sleep apnea   You were given anesthesia medicine during surgery to keep you comfortable and free of pain. After surgery, you may have more apnea spells because of this medicine and other medicines you were given. The spells may last longer than normal.    At home:  Keep using the continuous positive airway pressure (CPAP) device when you sleep. Unless your healthcare provider tells you not to, use it when you sleep, day or night. CPAP is a common device used to treat obstructive  sleep apnea.  Talk with your provider before taking any pain medicine, muscle relaxants, or sedatives. Your provider will tell you about the possible dangers of taking these medicines.  Contact your provider if your sleeping changes a lot even when taking medicines as directed.  James last reviewed this educational content on 4/1/2024  This information is for informational purposes only. This is not intended to be a substitute for professional medical advice, diagnosis, or treatment. Always seek the advice and follow the directions from your physician or other qualified health care provider.  © 5781-3917 [employer]. All rights reserved. This information is not intended as a substitute for professional medical care. Always follow your healthcare professional's instructions.

## 2025-07-02 NOTE — ANESTHESIA POSTPROCEDURE EVALUATION
Patient: Franco Mcnamara    Procedure Summary       Date: 07/02/25 Room / Location: [facility] MAIN OR  / [facility] MAIN OR    Anesthesia Start: 1058 Anesthesia Stop: 1210    Procedure: Rectal exam under anesthesia, incision and drainage of kirby rectal abscess, seton (Anus) Diagnosis: (Anal and rectal abscess)    Surgeons: Ricardo Neves MD Anesthesiologist: David Delgado MD    Anesthesia Type: general ASA Status: 2            Anesthesia Type: general    Vitals Value Taken Time   /90 07/02/25 12:06   Temp 36.8 07/02/25 12:12   Pulse 87 07/02/25 12:11   Resp 10 07/02/25 12:11   SpO2 93 % 07/02/25 12:11   Vitals shown include unfiled device data.    [facility] AN Post Evaluation:   Patient Evaluated in PACU  Patient Participation: complete - patient participated  Level of Consciousness: awake and alert  Pain Score: 0  Pain Management: satisfactory to patient  Airway Patency:patent  Yes    Nausea/Vomiting: none  Cardiovascular Status: acceptable  Respiratory Status: acceptable  Postoperative Hydration acceptable      HALEIGH BATES, DAVID GO MD  7/2/2025 12:12 PM

## 2025-07-02 NOTE — INTERVAL H&P NOTE
The following pre-op Diagnosis: Anal and rectal abscess    The above referenced H&P was reviewed by Ricardo Neves MD on 7/2/2025, the patient was examined and changes have occurred in the patient's condition since the H&P was performed.      He underwent incision and drainage of a perianal abscess 2 weeks ago.    A Malecot drain was placed in this which fell out a few days later.  He was seen in the emergency room at [facility] complaining of significant pain and it was removed.  Since then, he continues to have anal pain which has not improved from his condition before the I&D.  On examination in the office yesterday, induration was present in the right anterior perianum without an external opening.  Compression produced purulent discharge at the anus.  He now presents for incision and drainage, possible fistulotomy or seton placement depending on operative findings.    I discussed with the patient and/or legal representative the potential benefits, risks and side effects of this procedure; the likelihood of the patient achieving goals; and potential problems that might occur during recuperation.  I discussed reasonable alternatives to the procedure, including risks, benefits and side effects related to the alternatives and risks related to not receiving this procedure.  We will proceed with procedure as planned.

## 2025-07-02 NOTE — BRIEF OP NOTE
Pre-Operative Diagnosis: Anal and rectal abscess     Post-Operative Diagnosis: Anal and rectal abscess      Procedure Performed:   Rectal exam under anesthesia, incision and drainage of kirby rectal abscess, seton    Surgeons and Role:     * Ricardo Neves MD - Primary    Assistant(s):        Surgical Findings:   -right anterior ischiorectal abscess, compression produces purulent drainage from right anterior internal os  -cavity extends anteriorly about 3 cm  -10 Fr Malecot placed into anterior cavity extension  -fistula tract appears to be superficial but due to swelling it may involve more anal canal length than expected, therefore seton placed and anticipate conversion to cutting seton in 6-8 weeks       Specimen: none     Estimated Blood Loss: 5 mL      Ricardo Neves MD  7/2/2025  11:51 AM

## 2025-07-02 NOTE — ANESTHESIA PREPROCEDURE EVALUATION
Anesthesia PreOp Note    HPI:     Franco Mcnamara is a 55 year old male who presents for preoperative consultation requested by: Ricardo Neves MD    Date of Surgery: 7/2/2025    Procedure(s):  Rectal exam under anesthesia, incision and drainage of kirby rectal abscess, possible fistulotomy, possible seton  Anal fistulectomy  Indication: Anal and rectal abscess    Relevant Problems   No relevant active problems       NPO:  Last Liquid Consumption Date: 07/01/25  Last Liquid Consumption Time: 1830  Last Solid Consumption Date: 07/01/25  Last Solid Consumption Time: 1830  Last Liquid Consumption Date: 07/01/25          History Review:  Patient Active Problem List    Diagnosis Date Noted    Cellulitis of left upper extremity 09/30/2023    Cat bite, initial encounter 09/30/2023    Neutropenia, unspecified type 09/30/2023    Sepsis, due to unspecified organism, unspecified whether acute organ dysfunction present (HCC) 09/30/2023    Spondylolisthesis at L5-S1 level 07/29/2016    Tobacco use 09/14/2015       Past Medical History[1]    Past Surgical History[2]    Prescriptions Prior to Admission[3]  Current Medications and Prescriptions Ordered in Epic[4]    Allergies[5]    Family History[6]  Social Hx on file[7]    Available pre-op labs reviewed.             Vital Signs:  Body mass index is 28.98 kg/m².   height is 1.778 m (5' 10\") and weight is 91.6 kg (202 lb). His oral temperature is 98.7 °F (37.1 °C). His blood pressure is 119/74 and his pulse is 100. His respiration is 18 and oxygen saturation is 97%.   Vitals:    07/01/25 1400 07/02/25 0830   BP:  119/74   Pulse:  100   Resp:  18   Temp:  98.7 °F (37.1 °C)   TempSrc:  Oral   SpO2:  97%   Weight: 95.3 kg (210 lb) 91.6 kg (202 lb)   Height: 1.778 m (5' 10\") 1.778 m (5' 10\")        Anesthesia Evaluation     Patient summary reviewed    Airway   Mallampati: II  TM distance: >3 FB  Neck ROM: full  Dental - Dentition appears grossly intact     Pulmonary - normal exam    Cardiovascular - normal exam    Neuro/Psych      GI/Hepatic/Renal      Endo/Other    Abdominal                  Anesthesia Plan:   ASA:  2  Plan:   General  Informed Consent Plan and Risks Discussed With:  Patient      I have informed Franco Mcnamara and/or legal guardian or family member of the nature of the anesthetic plan, benefits, risks including possible dental damage if relevant, major complications, and any alternative forms of anesthetic management.   All of the patient's questions were answered to the best of my ability. The patient desires the anesthetic management as planned.  HALEIGH BATES, DAVID GO MD  2025 10:48 AM  Present on Admission:  **None**           [1]   Past Medical History:   Back problem   [2]   Past Surgical History:  Procedure Laterality Date    Appendectomy      Inguinal hernia repair Bilateral     Orbital prosthesis Left     Other surgical history      I&D left wrist d/t cat bite    Repair rotator cuff,acute     [3]   Medications Prior to Admission   Medication Sig Dispense Refill Last Dose/Taking    HYDROcodone-acetaminophen 5-325 MG Oral Tab Take 1 tablet by mouth every 4 (four) hours as needed. 20 tablet 0 2025 Morning    [] HYDROcodone-acetaminophen 5-325 MG Oral Tab Take 1-2 tablets by mouth every 6 (six) hours as needed for Pain. 10 tablet 0     diclofenac 75 MG Oral Tab EC Take 1 tablet (75 mg total) by mouth 2 (two) times daily as needed. WITH FOOD (Patient not taking: Reported on 2025)   More than a month   [4]   Current Facility-Administered Medications Ordered in Epic   Medication Dose Route Frequency Provider Last Rate Last Admin    lactated ringers infusion   Intravenous Continuous Ricardo Neves MD 20 mL/hr at 25 0851 New Bag at 25 0851     No current [facility]-ordered outpatient medications on file.   [5] No Known Allergies  [6]   Family History  Problem Relation Age of Onset    Heart Disorder Father     Hypertension Mother      Cancer Mother 73        Breast CA   [7]   Social History  Socioeconomic History    Marital status: Single   Tobacco Use    Smoking status: Former     Current packs/day: 0.00     Types: Cigarettes     Quit date:      Years since quittin.5    Smokeless tobacco: Never   Vaping Use    Vaping status: Never Used   Substance and Sexual Activity    Alcohol use: No     Alcohol/week: 0.0 standard drinks of alcohol    Drug use: No    Sexual activity: Never     Partners: Female

## 2025-07-02 NOTE — OPERATIVE REPORT
Operative Note    Patient Name: Franco Mcnamara    Date of Surgery: 07/02/25 ***    Preoperative Diagnosis: Anal and rectal abscess    Postoperative Diagnosis: same    Primary Surgeon: Ricardo Neves MD    Assistant: ***    Procedures: ***    Surgical Findings: ***    Anesthesia: General    Complications: none    Implants: ***    Specimen: ***    Drains: ***    Condition: ***    Estimated Blood Loss: ***      OPERATIVE REPORT    PATIENT NAME: Franco Mcnamara   MR#: [id number]  ATT SURGEON: Ricardo Neves MD   PROCEDURE DATE: ***  OTHER SURGEON: ***    HOSPITAL SERVICE: General Surgery Service 1  1ST ASST: ***   _________________________________________________________________      ASSISTANT  ***    PREOPERATIVE DIAGNOSIS  Anal fistula    POSTOPERATIVE DIAGNOSIS  Anal fistula    PROCEDURE  EUA,  seton placement    DESCRIPTION OF PROCEDURE    INDICATION   The patient is a 55 year old male with ***    The procedure, indications, risks, benefits, and alternatives were discussed with the patient prior to the procedure. All questions were answered, and the patient wished to proceed.    TECHNIQUE  The patient was taken to the operating room and general anesthesia was induced. The patient was then repositioned in the *** position on the operating table using ***the Clint frame ***candy cane stirrups with appropriate attention to all joints and pressure points. The perianum was prepared with Betadine and draped in the usual sterile fashion. Timeout was called to reconfirm the patient's identity, diagnosis, planned procedure, and completeness of preoperative preparations.    The procedure began with examination of the anus. Visual inspection, digital exam, compression on the *** abscess and anoscopy using an angled Garrett retractor demonstrated an internal opening of an anal fistula in the *** of the anal canal at the ***dentate line with associated external opening in the *** perianum. Peroxide irrigation further  demonstrated the internal opening.    A probe was then used to identify the course of the fistula tract beginning at the external os, which was a linear path to the internal opening at the dentate line. The amount of anal sphincter traversed by the fistula and to be cut during fistulotomy was estimated to be ***%. Given ***, we decided to place a seton. This was done by tying a silk suture to the probe and then a medium blue silastic vessel loop to the silk tie. This allowed us to exchange the vessel loop for the probe. The two ends of the vessel loop were tied to each other in an overlapping fashion with silk ties x 3.    Dry sterile gauze was apllied and held in place with mesh underwear. The patient tolerated the procedure well. ***e was returned to the transportation cart, extubated in the operating room and taken to the recovery room in satisfactory condition. Sponge, needle, instrument counts were correct on 2 separate occasions at the end of the procedure. I was present during the entire operation.    _____________________________________   Attending Surgeon: Ricardo Neves MD   Dictated By: Ricardo Neves MD

## 2025-07-02 NOTE — ANESTHESIA PROCEDURE NOTES
Airway  Date/Time: 7/2/2025 11:03 AM  Reason: elective      General Information and Staff   Patient location during procedure: OR  Anesthesiologist: Ajay Delgado MD  Performed: anesthesiologist   Performed by: Ajay Delgado MD  Authorized by: Ajay Delgado MD        Indications and Patient Condition  Indications for airway management: airway protection and anesthesia  Sedation level: deep      Preoxygenated: yesMILS maintained throughout    Mask difficulty assessment: 1 - vent by mask    Final Airway Details    Final airway type: endotracheal airway    Successful airway: ETT  Cuffed: yes   Successful intubation technique: Video laryngoscopy  Facilitating devices/methods: intubating stylet and cricoid pressure  Endotracheal tube insertion site: oral  Blade: GlideScope  Blade size: #4  ETT size (mm): 7.5    Cormack-Lehane Classification: grade I - full view of glottis  Inital cuff pressure (cm H2O): 20  Measured from: teeth  ETT to teeth (cm): 23  Number of attempts at approach: 1  Number of other approaches attempted: 0

## 2025-08-20 RX ORDER — IBUPROFEN 200 MG
200 TABLET ORAL EVERY 6 HOURS PRN
COMMUNITY
End: 2025-08-28

## 2025-08-21 ENCOUNTER — LABORATORY ENCOUNTER (OUTPATIENT)
Dept: LAB | Age: 55
End: 2025-08-21
Attending: UROLOGY

## 2025-08-21 DIAGNOSIS — Z01.818 ENCOUNTER FOR PREADMISSION TESTING: ICD-10-CM

## 2025-08-21 LAB
ANTIBODY SCREEN: NEGATIVE
RH BLOOD TYPE: POSITIVE

## 2025-08-21 PROCEDURE — 86850 RBC ANTIBODY SCREEN: CPT

## 2025-08-21 PROCEDURE — 86901 BLOOD TYPING SEROLOGIC RH(D): CPT

## 2025-08-21 PROCEDURE — 86900 BLOOD TYPING SEROLOGIC ABO: CPT

## 2025-08-25 ENCOUNTER — ANESTHESIA EVENT (OUTPATIENT)
Dept: SURGERY | Facility: HOSPITAL | Age: 55
End: 2025-08-25

## 2025-08-26 ENCOUNTER — ANESTHESIA (OUTPATIENT)
Dept: SURGERY | Facility: HOSPITAL | Age: 55
End: 2025-08-26

## 2025-08-26 ENCOUNTER — HOSPITAL ENCOUNTER (OUTPATIENT)
Facility: HOSPITAL | Age: 55
Discharge: HOME | End: 2025-08-28
Attending: UROLOGY | Admitting: UROLOGY

## 2025-08-26 DIAGNOSIS — Z01.818 ENCOUNTER FOR PREADMISSION TESTING: Primary | ICD-10-CM

## 2025-08-26 DIAGNOSIS — N28.89 RIGHT KIDNEY MASS: ICD-10-CM

## 2025-08-26 LAB — RH BLOOD TYPE: POSITIVE

## 2025-08-26 PROCEDURE — 76942 ECHO GUIDE FOR BIOPSY: CPT | Performed by: STUDENT IN AN ORGANIZED HEALTH CARE EDUCATION/TRAINING PROGRAM

## 2025-08-26 PROCEDURE — 94760 N-INVAS EAR/PLS OXIMETRY 1: CPT

## 2025-08-26 RX ORDER — BUPIVACAINE HYDROCHLORIDE 2.5 MG/ML
INJECTION, SOLUTION EPIDURAL; INFILTRATION; INTRACAUDAL; PERINEURAL AS NEEDED
Status: DISCONTINUED | OUTPATIENT
Start: 2025-08-26 | End: 2025-08-26 | Stop reason: HOSPADM

## 2025-08-26 RX ORDER — ACETAMINOPHEN 500 MG
1000 TABLET ORAL ONCE
Status: DISCONTINUED | OUTPATIENT
Start: 2025-08-26 | End: 2025-08-26 | Stop reason: HOSPADM

## 2025-08-26 RX ORDER — HYDROMORPHONE HYDROCHLORIDE 1 MG/ML
0.6 INJECTION, SOLUTION INTRAMUSCULAR; INTRAVENOUS; SUBCUTANEOUS EVERY 5 MIN PRN
Status: DISCONTINUED | OUTPATIENT
Start: 2025-08-26 | End: 2025-08-26 | Stop reason: HOSPADM

## 2025-08-26 RX ORDER — HYDROCODONE BITARTRATE AND ACETAMINOPHEN 5; 325 MG/1; MG/1
1 TABLET ORAL EVERY 4 HOURS PRN
Status: DISCONTINUED | OUTPATIENT
Start: 2025-08-26 | End: 2025-08-28

## 2025-08-26 RX ORDER — PHENYLEPHRINE HCL 10 MG/ML
VIAL (ML) INJECTION AS NEEDED
Status: DISCONTINUED | OUTPATIENT
Start: 2025-08-26 | End: 2025-08-26 | Stop reason: SURG

## 2025-08-26 RX ORDER — FAMOTIDINE 20 MG/1
20 TABLET, FILM COATED ORAL 2 TIMES DAILY
Status: DISCONTINUED | OUTPATIENT
Start: 2025-08-26 | End: 2025-08-28

## 2025-08-26 RX ORDER — LIDOCAINE HYDROCHLORIDE 10 MG/ML
INJECTION, SOLUTION EPIDURAL; INFILTRATION; INTRACAUDAL; PERINEURAL AS NEEDED
Status: DISCONTINUED | OUTPATIENT
Start: 2025-08-26 | End: 2025-08-26 | Stop reason: SURG

## 2025-08-26 RX ORDER — SODIUM CHLORIDE, SODIUM LACTATE, POTASSIUM CHLORIDE, CALCIUM CHLORIDE 600; 310; 30; 20 MG/100ML; MG/100ML; MG/100ML; MG/100ML
INJECTION, SOLUTION INTRAVENOUS CONTINUOUS
Status: DISCONTINUED | OUTPATIENT
Start: 2025-08-26 | End: 2025-08-26

## 2025-08-26 RX ORDER — OXYCODONE HYDROCHLORIDE 10 MG/1
10 TABLET ORAL EVERY 4 HOURS PRN
Status: DISCONTINUED | OUTPATIENT
Start: 2025-08-26 | End: 2025-08-28

## 2025-08-26 RX ORDER — ESMOLOL HYDROCHLORIDE 10 MG/ML
INJECTION INTRAVENOUS AS NEEDED
Status: DISCONTINUED | OUTPATIENT
Start: 2025-08-26 | End: 2025-08-26 | Stop reason: SURG

## 2025-08-26 RX ORDER — ONDANSETRON 2 MG/ML
4 INJECTION INTRAMUSCULAR; INTRAVENOUS EVERY 6 HOURS PRN
Status: DISCONTINUED | OUTPATIENT
Start: 2025-08-26 | End: 2025-08-26 | Stop reason: HOSPADM

## 2025-08-26 RX ORDER — DROPERIDOL 2.5 MG/ML
INJECTION, SOLUTION INTRAMUSCULAR; INTRAVENOUS AS NEEDED
Status: DISCONTINUED | OUTPATIENT
Start: 2025-08-26 | End: 2025-08-26 | Stop reason: SURG

## 2025-08-26 RX ORDER — HEPARIN SODIUM 5000 [USP'U]/ML
5000 INJECTION, SOLUTION INTRAVENOUS; SUBCUTANEOUS ONCE
Status: COMPLETED | OUTPATIENT
Start: 2025-08-26 | End: 2025-08-26

## 2025-08-26 RX ORDER — BUPIVACAINE HYDROCHLORIDE 2.5 MG/ML
INJECTION, SOLUTION EPIDURAL; INFILTRATION; INTRACAUDAL; PERINEURAL AS NEEDED
Status: DISCONTINUED | OUTPATIENT
Start: 2025-08-26 | End: 2025-08-26 | Stop reason: SURG

## 2025-08-26 RX ORDER — HYDROMORPHONE HYDROCHLORIDE 1 MG/ML
0.8 INJECTION, SOLUTION INTRAMUSCULAR; INTRAVENOUS; SUBCUTANEOUS EVERY 2 HOUR PRN
Status: DISCONTINUED | OUTPATIENT
Start: 2025-08-26 | End: 2025-08-28

## 2025-08-26 RX ORDER — ONDANSETRON 2 MG/ML
4 INJECTION INTRAMUSCULAR; INTRAVENOUS EVERY 6 HOURS PRN
Status: DISCONTINUED | OUTPATIENT
Start: 2025-08-26 | End: 2025-08-28

## 2025-08-26 RX ORDER — ROCURONIUM BROMIDE 10 MG/ML
INJECTION, SOLUTION INTRAVENOUS AS NEEDED
Status: DISCONTINUED | OUTPATIENT
Start: 2025-08-26 | End: 2025-08-26 | Stop reason: SURG

## 2025-08-26 RX ORDER — DEXAMETHASONE SODIUM PHOSPHATE 10 MG/ML
INJECTION, SOLUTION INTRAMUSCULAR; INTRAVENOUS AS NEEDED
Status: DISCONTINUED | OUTPATIENT
Start: 2025-08-26 | End: 2025-08-26

## 2025-08-26 RX ORDER — SENNOSIDES 8.6 MG
17.2 TABLET ORAL NIGHTLY
Status: DISCONTINUED | OUTPATIENT
Start: 2025-08-26 | End: 2025-08-28

## 2025-08-26 RX ORDER — NALOXONE HYDROCHLORIDE 0.4 MG/ML
0.08 INJECTION, SOLUTION INTRAMUSCULAR; INTRAVENOUS; SUBCUTANEOUS AS NEEDED
Status: DISCONTINUED | OUTPATIENT
Start: 2025-08-26 | End: 2025-08-26 | Stop reason: HOSPADM

## 2025-08-26 RX ORDER — HYDROMORPHONE HYDROCHLORIDE 1 MG/ML
0.4 INJECTION, SOLUTION INTRAMUSCULAR; INTRAVENOUS; SUBCUTANEOUS EVERY 5 MIN PRN
Status: DISCONTINUED | OUTPATIENT
Start: 2025-08-26 | End: 2025-08-26 | Stop reason: HOSPADM

## 2025-08-26 RX ORDER — DOCUSATE SODIUM 100 MG/1
100 CAPSULE, LIQUID FILLED ORAL 2 TIMES DAILY
Status: DISCONTINUED | OUTPATIENT
Start: 2025-08-26 | End: 2025-08-28

## 2025-08-26 RX ORDER — ONDANSETRON 2 MG/ML
INJECTION INTRAMUSCULAR; INTRAVENOUS AS NEEDED
Status: DISCONTINUED | OUTPATIENT
Start: 2025-08-26 | End: 2025-08-26 | Stop reason: SURG

## 2025-08-26 RX ORDER — FAMOTIDINE 10 MG/ML
20 INJECTION, SOLUTION INTRAVENOUS 2 TIMES DAILY
Status: DISCONTINUED | OUTPATIENT
Start: 2025-08-26 | End: 2025-08-28

## 2025-08-26 RX ORDER — SODIUM CHLORIDE 9 MG/ML
INJECTION, SOLUTION INTRAVENOUS CONTINUOUS
Status: DISCONTINUED | OUTPATIENT
Start: 2025-08-26 | End: 2025-08-28

## 2025-08-26 RX ORDER — SODIUM CHLORIDE 9 MG/ML
INJECTION, SOLUTION INTRAVENOUS CONTINUOUS PRN
Status: DISCONTINUED | OUTPATIENT
Start: 2025-08-26 | End: 2025-08-26

## 2025-08-26 RX ORDER — DEXAMETHASONE SODIUM PHOSPHATE 4 MG/ML
VIAL (ML) INJECTION AS NEEDED
Status: DISCONTINUED | OUTPATIENT
Start: 2025-08-26 | End: 2025-08-26 | Stop reason: SURG

## 2025-08-26 RX ORDER — KETAMINE HYDROCHLORIDE 50 MG/ML
INJECTION, SOLUTION INTRAMUSCULAR; INTRAVENOUS AS NEEDED
Status: DISCONTINUED | OUTPATIENT
Start: 2025-08-26 | End: 2025-08-26 | Stop reason: SURG

## 2025-08-26 RX ORDER — SODIUM CHLORIDE, SODIUM LACTATE, POTASSIUM CHLORIDE, CALCIUM CHLORIDE 600; 310; 30; 20 MG/100ML; MG/100ML; MG/100ML; MG/100ML
INJECTION, SOLUTION INTRAVENOUS CONTINUOUS
Status: DISCONTINUED | OUTPATIENT
Start: 2025-08-26 | End: 2025-08-26 | Stop reason: HOSPADM

## 2025-08-26 RX ORDER — HYDROMORPHONE HYDROCHLORIDE 1 MG/ML
0.4 INJECTION, SOLUTION INTRAMUSCULAR; INTRAVENOUS; SUBCUTANEOUS EVERY 2 HOUR PRN
Status: DISCONTINUED | OUTPATIENT
Start: 2025-08-26 | End: 2025-08-28

## 2025-08-26 RX ORDER — HEPARIN SODIUM 5000 [USP'U]/ML
INJECTION, SOLUTION INTRAVENOUS; SUBCUTANEOUS
Status: DISCONTINUED
Start: 2025-08-26 | End: 2025-08-26 | Stop reason: ALTCHOICE

## 2025-08-26 RX ORDER — MIDAZOLAM HYDROCHLORIDE 1 MG/ML
INJECTION INTRAMUSCULAR; INTRAVENOUS AS NEEDED
Status: DISCONTINUED | OUTPATIENT
Start: 2025-08-26 | End: 2025-08-26 | Stop reason: SURG

## 2025-08-26 RX ORDER — ENOXAPARIN SODIUM 100 MG/ML
40 INJECTION SUBCUTANEOUS NIGHTLY
Status: DISCONTINUED | OUTPATIENT
Start: 2025-08-26 | End: 2025-08-28

## 2025-08-26 RX ORDER — DEXAMETHASONE SODIUM PHOSPHATE 10 MG/ML
INJECTION, SOLUTION INTRAMUSCULAR; INTRAVENOUS AS NEEDED
Status: DISCONTINUED | OUTPATIENT
Start: 2025-08-26 | End: 2025-08-26 | Stop reason: SURG

## 2025-08-26 RX ORDER — HYDROMORPHONE HYDROCHLORIDE 1 MG/ML
0.2 INJECTION, SOLUTION INTRAMUSCULAR; INTRAVENOUS; SUBCUTANEOUS EVERY 5 MIN PRN
Status: DISCONTINUED | OUTPATIENT
Start: 2025-08-26 | End: 2025-08-26 | Stop reason: HOSPADM

## 2025-08-26 RX ORDER — LABETALOL HYDROCHLORIDE 5 MG/ML
5 INJECTION, SOLUTION INTRAVENOUS EVERY 5 MIN PRN
Status: DISCONTINUED | OUTPATIENT
Start: 2025-08-26 | End: 2025-08-26 | Stop reason: HOSPADM

## 2025-08-26 RX ORDER — ACETAMINOPHEN 325 MG/1
650 TABLET ORAL
Status: DISCONTINUED | OUTPATIENT
Start: 2025-08-26 | End: 2025-08-28

## 2025-08-26 RX ORDER — OXYCODONE HYDROCHLORIDE 5 MG/1
5 TABLET ORAL EVERY 4 HOURS PRN
Status: DISCONTINUED | OUTPATIENT
Start: 2025-08-26 | End: 2025-08-28

## 2025-08-26 RX ORDER — PROCHLORPERAZINE EDISYLATE 5 MG/ML
5 INJECTION INTRAMUSCULAR; INTRAVENOUS EVERY 8 HOURS PRN
Status: DISCONTINUED | OUTPATIENT
Start: 2025-08-26 | End: 2025-08-26 | Stop reason: HOSPADM

## 2025-08-26 RX ORDER — BUPIVACAINE HYDROCHLORIDE 2.5 MG/ML
INJECTION, SOLUTION EPIDURAL; INFILTRATION; INTRACAUDAL; PERINEURAL AS NEEDED
Status: DISCONTINUED | OUTPATIENT
Start: 2025-08-26 | End: 2025-08-26

## 2025-08-26 RX ORDER — SODIUM CHLORIDE, SODIUM LACTATE, POTASSIUM CHLORIDE, CALCIUM CHLORIDE 600; 310; 30; 20 MG/100ML; MG/100ML; MG/100ML; MG/100ML
INJECTION, SOLUTION INTRAVENOUS CONTINUOUS PRN
Status: DISCONTINUED | OUTPATIENT
Start: 2025-08-26 | End: 2025-08-26 | Stop reason: SURG

## 2025-08-26 RX ADMIN — BUPIVACAINE HYDROCHLORIDE 30 ML: 2.5 INJECTION, SOLUTION EPIDURAL; INFILTRATION; INTRACAUDAL; PERINEURAL at 15:23:00

## 2025-08-26 RX ADMIN — LIDOCAINE HYDROCHLORIDE 50 MG: 10 INJECTION, SOLUTION EPIDURAL; INFILTRATION; INTRACAUDAL; PERINEURAL at 15:02:00

## 2025-08-26 RX ADMIN — PHENYLEPHRINE HCL 100 MCG: 10 MG/ML VIAL (ML) INJECTION at 15:41:00

## 2025-08-26 RX ADMIN — DEXAMETHASONE SODIUM PHOSPHATE 8 MG: 4 MG/ML VIAL (ML) INJECTION at 15:02:00

## 2025-08-26 RX ADMIN — DEXAMETHASONE SODIUM PHOSPHATE 3 MG: 10 INJECTION, SOLUTION INTRAMUSCULAR; INTRAVENOUS at 15:23:00

## 2025-08-26 RX ADMIN — ESMOLOL HYDROCHLORIDE 50 MG: 10 INJECTION INTRAVENOUS at 15:04:00

## 2025-08-26 RX ADMIN — DROPERIDOL 0.62 MG: 2.5 INJECTION, SOLUTION INTRAMUSCULAR; INTRAVENOUS at 15:02:00

## 2025-08-26 RX ADMIN — SODIUM CHLORIDE, SODIUM LACTATE, POTASSIUM CHLORIDE, CALCIUM CHLORIDE: 600; 310; 30; 20 INJECTION, SOLUTION INTRAVENOUS at 14:57:00

## 2025-08-26 RX ADMIN — ROCURONIUM BROMIDE 70 MG: 10 INJECTION, SOLUTION INTRAVENOUS at 15:03:00

## 2025-08-26 RX ADMIN — SODIUM CHLORIDE, SODIUM LACTATE, POTASSIUM CHLORIDE, CALCIUM CHLORIDE: 600; 310; 30; 20 INJECTION, SOLUTION INTRAVENOUS at 17:23:00

## 2025-08-26 RX ADMIN — ROCURONIUM BROMIDE 10 MG: 10 INJECTION, SOLUTION INTRAVENOUS at 16:34:00

## 2025-08-26 RX ADMIN — SODIUM CHLORIDE, SODIUM LACTATE, POTASSIUM CHLORIDE, CALCIUM CHLORIDE: 600; 310; 30; 20 INJECTION, SOLUTION INTRAVENOUS at 15:10:00

## 2025-08-26 RX ADMIN — KETAMINE HYDROCHLORIDE 20 MG: 50 INJECTION, SOLUTION INTRAMUSCULAR; INTRAVENOUS at 15:02:00

## 2025-08-26 RX ADMIN — ROCURONIUM BROMIDE 30 MG: 10 INJECTION, SOLUTION INTRAVENOUS at 16:06:00

## 2025-08-26 RX ADMIN — MIDAZOLAM HYDROCHLORIDE 2 MG: 1 INJECTION INTRAMUSCULAR; INTRAVENOUS at 15:00:00

## 2025-08-26 RX ADMIN — ONDANSETRON 4 MG: 2 INJECTION INTRAMUSCULAR; INTRAVENOUS at 16:58:00

## 2025-08-26 RX ADMIN — PHENYLEPHRINE HCL 50 MCG: 10 MG/ML VIAL (ML) INJECTION at 15:18:00

## 2025-08-27 LAB
ANION GAP SERPL CALC-SCNC: 7 MMOL/L (ref 0–18)
BASOPHILS # BLD AUTO: 0 X10(3) UL (ref 0–0.2)
BASOPHILS NFR BLD AUTO: 0 %
BUN BLD-MCNC: 10 MG/DL (ref 9–23)
CALCIUM BLD-MCNC: 9.4 MG/DL (ref 8.7–10.6)
CHLORIDE SERPL-SCNC: 102 MMOL/L (ref 98–112)
CO2 SERPL-SCNC: 27 MMOL/L (ref 21–32)
CREAT BLD-MCNC: 1.54 MG/DL (ref 0.7–1.3)
EGFRCR SERPLBLD CKD-EPI 2021: 53 ML/MIN/1.73M2 (ref 60–?)
EOSINOPHIL # BLD AUTO: 0 X10(3) UL (ref 0–0.7)
EOSINOPHIL NFR BLD AUTO: 0 %
ERYTHROCYTE [DISTWIDTH] IN BLOOD BY AUTOMATED COUNT: 15.4 %
GLUCOSE BLD-MCNC: 104 MG/DL (ref 70–99)
HCT VFR BLD AUTO: 34.5 % (ref 39–53)
HGB BLD-MCNC: 11.6 G/DL (ref 13–17.5)
IMM GRANULOCYTES # BLD AUTO: 0 X10(3) UL (ref 0–1)
IMM GRANULOCYTES NFR BLD: 0 %
LYMPHOCYTES # BLD AUTO: 1.66 X10(3) UL (ref 1–4)
LYMPHOCYTES NFR BLD AUTO: 63.8 %
MCH RBC QN AUTO: 26.2 PG (ref 26–34)
MCHC RBC AUTO-ENTMCNC: 33.6 G/DL (ref 31–37)
MCV RBC AUTO: 77.9 FL (ref 80–100)
MONOCYTES # BLD AUTO: 0.53 X10(3) UL (ref 0.1–1)
MONOCYTES NFR BLD AUTO: 20.4 %
NEUTROPHILS # BLD AUTO: 0.41 X10 (3) UL (ref 1.5–7.7)
NEUTROPHILS # BLD AUTO: 0.41 X10(3) UL (ref 1.5–7.7)
NEUTROPHILS NFR BLD AUTO: 15.8 %
OSMOLALITY SERPL CALC.SUM OF ELEC: 281 MOSM/KG (ref 275–295)
PLATELET # BLD AUTO: 155 10(3)UL (ref 150–450)
POTASSIUM SERPL-SCNC: 4.6 MMOL/L (ref 3.5–5.1)
RBC # BLD AUTO: 4.43 X10(6)UL (ref 4.3–5.7)
SODIUM SERPL-SCNC: 136 MMOL/L (ref 136–145)
WBC # BLD AUTO: 2.6 X10(3) UL (ref 4–11)

## 2025-08-27 PROCEDURE — 85025 COMPLETE CBC W/AUTO DIFF WBC: CPT | Performed by: UROLOGY

## 2025-08-27 PROCEDURE — 80048 BASIC METABOLIC PNL TOTAL CA: CPT | Performed by: UROLOGY

## 2025-08-27 RX ORDER — PSEUDOEPHEDRINE HCL 30 MG
100 TABLET ORAL 2 TIMES DAILY PRN
Qty: 30 CAPSULE | Refills: 0 | Status: SHIPPED | OUTPATIENT
Start: 2025-08-27

## 2025-08-28 VITALS
WEIGHT: 202.19 LBS | DIASTOLIC BLOOD PRESSURE: 83 MMHG | HEART RATE: 93 BPM | OXYGEN SATURATION: 94 % | SYSTOLIC BLOOD PRESSURE: 148 MMHG | RESPIRATION RATE: 18 BRPM | TEMPERATURE: 98 F | HEIGHT: 70 IN | BODY MASS INDEX: 28.95 KG/M2

## 2025-08-28 LAB
ANION GAP SERPL CALC-SCNC: 10 MMOL/L (ref 0–18)
BASOPHILS # BLD AUTO: 0.01 X10(3) UL (ref 0–0.2)
BASOPHILS NFR BLD AUTO: 0.5 %
BUN BLD-MCNC: 9 MG/DL (ref 9–23)
CALCIUM BLD-MCNC: 9.5 MG/DL (ref 8.7–10.6)
CHLORIDE SERPL-SCNC: 100 MMOL/L (ref 98–112)
CO2 SERPL-SCNC: 25 MMOL/L (ref 21–32)
CREAT BLD-MCNC: 1.58 MG/DL (ref 0.7–1.3)
EGFRCR SERPLBLD CKD-EPI 2021: 51 ML/MIN/1.73M2 (ref 60–?)
EOSINOPHIL # BLD AUTO: 0.01 X10(3) UL (ref 0–0.7)
EOSINOPHIL NFR BLD AUTO: 0.5 %
ERYTHROCYTE [DISTWIDTH] IN BLOOD BY AUTOMATED COUNT: 15.2 %
GLUCOSE BLD-MCNC: 124 MG/DL (ref 70–99)
HCT VFR BLD AUTO: 37.4 % (ref 39–53)
HGB BLD-MCNC: 12.6 G/DL (ref 13–17.5)
IMM GRANULOCYTES # BLD AUTO: 0.01 X10(3) UL (ref 0–1)
IMM GRANULOCYTES NFR BLD: 0.5 %
LYMPHOCYTES # BLD AUTO: 1.2 X10(3) UL (ref 1–4)
LYMPHOCYTES NFR BLD AUTO: 57.1 %
MCH RBC QN AUTO: 26.4 PG (ref 26–34)
MCHC RBC AUTO-ENTMCNC: 33.7 G/DL (ref 31–37)
MCV RBC AUTO: 78.2 FL (ref 80–100)
MONOCYTES # BLD AUTO: 0.49 X10(3) UL (ref 0.1–1)
MONOCYTES NFR BLD AUTO: 23.3 %
NEUTROPHILS # BLD AUTO: 0.38 X10 (3) UL (ref 1.5–7.7)
NEUTROPHILS # BLD AUTO: 0.38 X10(3) UL (ref 1.5–7.7)
NEUTROPHILS NFR BLD AUTO: 18.1 %
OSMOLALITY SERPL CALC.SUM OF ELEC: 280 MOSM/KG (ref 275–295)
PLATELET # BLD AUTO: 160 10(3)UL (ref 150–450)
POTASSIUM SERPL-SCNC: 4.3 MMOL/L (ref 3.5–5.1)
RBC # BLD AUTO: 4.78 X10(6)UL (ref 4.3–5.7)
SODIUM SERPL-SCNC: 135 MMOL/L (ref 136–145)
WBC # BLD AUTO: 2.1 X10(3) UL (ref 4–11)

## 2025-08-28 PROCEDURE — 80048 BASIC METABOLIC PNL TOTAL CA: CPT | Performed by: UROLOGY

## 2025-08-28 PROCEDURE — 85025 COMPLETE CBC W/AUTO DIFF WBC: CPT | Performed by: UROLOGY

## 2025-08-28 RX ORDER — HYDROCODONE BITARTRATE AND ACETAMINOPHEN 5; 325 MG/1; MG/1
1 TABLET ORAL EVERY 6 HOURS PRN
Qty: 10 TABLET | Refills: 0 | Status: SHIPPED | OUTPATIENT
Start: 2025-08-28

## (undated) DEVICE — Device

## (undated) DEVICE — COVER LT HNDL RIG FOR SUR CAM DISP

## (undated) DEVICE — PAD PREP 24X43.5IN W/ PCH

## (undated) DEVICE — BASIC DOUBLE BASIN 1-LF: Brand: MEDLINE INDUSTRIES, INC.

## (undated) DEVICE — SOLUTION IRRIG 1000ML 0.9% NACL USP BTL

## (undated) DEVICE — DISPOSABLE TOURNIQUET CUFF SINGLE BLADDER, DUAL PORT AND QUICK CONNECT CONNECTOR: Brand: COLOR CUFF

## (undated) DEVICE — HANDLE SUCT REG CAP FLANGETIP SMOOTH YANK

## (undated) DEVICE — GAMMEX® PI HYBRID SIZE 7.5, STERILE POWDER-FREE SURGICAL GLOVE, POLYISOPRENE AND NEOPRENE BLEND: Brand: GAMMEX

## (undated) DEVICE — SUT PERMA- 2-0 18IN FS NABSRB BLK 26MM 3/8

## (undated) DEVICE — GLOVE SUR 7.5 SENSICARE PI PIP CRM PWD F

## (undated) DEVICE — UPPER EXTREMITY CDS-LF: Brand: MEDLINE INDUSTRIES, INC.

## (undated) DEVICE — SLEEVE COMPR M KNEE LEN SGL USE KENDALL SCD

## (undated) DEVICE — DAVINCI XI CLIP HEM O LOK LARGE PURPLE

## (undated) DEVICE — TRAY CATH 16FR F INCL BARDX IC COMPLT CARE

## (undated) DEVICE — SOLUTION ANTIFOG VIS SYS CLEARIFY LAPSCP

## (undated) DEVICE — SUT MCRYL 4-0 18IN PS-2 ABSRB UD 19MM 3/8 CIR

## (undated) DEVICE — MINOR GENERAL: Brand: MEDLINE INDUSTRIES, INC.

## (undated) DEVICE — PACK CDS MINOR GENERAL

## (undated) DEVICE — OINTMENT CURAD BACITRACIN .3OZ

## (undated) DEVICE — SOLUTION IV 1000ML DIL ST H2O

## (undated) DEVICE — CLEANER ESURG TIP 2X2IN CAUT

## (undated) DEVICE — SHEET, T, LAPAROTOMY, STERILE: Brand: MEDLINE

## (undated) DEVICE — NEEDLE INSUF 13GA L120MM LAP SPR LD BLNT STYL

## (undated) DEVICE — SUT COAT VCRL+ 0 27IN UR-6 ABSRB VLT ANTIBACT

## (undated) DEVICE — PADDING CAST W3XL144IN WHT COT SYN RL

## (undated) DEVICE — STERILE POLYISOPRENE POWDER-FREE SURGICAL GLOVES: Brand: PROTEXIS

## (undated) DEVICE — Device: Brand: JELCO

## (undated) DEVICE — STRAP OR POS W3.5XL19IN FOAM STRRP W/ SLIP

## (undated) DEVICE — STOCKINETTE SUR W9XL48IN IMPERV FOR HANDLING

## (undated) DEVICE — DRAPE EXT W21XH19XL10.5IN DA VINCI XI

## (undated) DEVICE — DISPOSABLE BIPOLAR FORCEPS 4" (10.2CM) JEWELERS, STRAIGHT 0.4MM TIP AND 12 FT. (3.6M) CABLE: Brand: KIRWAN

## (undated) DEVICE — UNDERPANTS MAT 2XL FOR 24-64IN WAIST PUR

## (undated) DEVICE — SUTURE ETHLN SZ 3-0 L18IN NONABSORBABLE BLK

## (undated) DEVICE — ZZ-CONVERTED-TO-310301 MEGADYNE 2.75IN NEEDLE MONO

## (undated) DEVICE — SPONGE GZ 4XL4IN 100% COT 12 PLY TYP VII WVN

## (undated) DEVICE — SET CYSTO IRRIG L77IN DIA0.241IN BLDR NVENT

## (undated) DEVICE — TRAY PREP WET SKIN 4 COMPARTMENT 6 SPNG 2 TWL

## (undated) DEVICE — SUT PROL 4-0 36IN RB-1 NABSRB BLU 17MM 1/2 CI

## (undated) DEVICE — ROBOTIC GENERAL CUSTOM PK

## (undated) DEVICE — SUT COAT VCRL + 0 54IN ABSRB UD ANTIBACT

## (undated) DEVICE — SUTURE ETHILON 3-0 PS2 1669H

## (undated) DEVICE — STANDARD HYPODERMIC NEEDLE,POLYPROPYLENE HUB: Brand: MONOJECT

## (undated) DEVICE — SOLUTION IRRIG 3000ML 0.9% NACL FLX CONT

## (undated) DEVICE — SUT PERMA- 2-0 18IN NABSRB BLK TIE SILK

## (undated) DEVICE — CONTAINER SPEC 4OZ POLYPR GRAD LEAK RESIST

## (undated) DEVICE — GOWN,SIRUS,FABRIC-REINFORCED,X-LARGE: Brand: MEDLINE

## (undated) DEVICE — SKIN PREP TRAY 4 COMPARTM TRAY: Brand: MEDLINE INDUSTRIES, INC.

## (undated) DEVICE — SURGICEL SNOW HEMOSTAT 4X4IN ABSORBABLE

## (undated) DEVICE — INSULATED BLADE ELECTRODE: Brand: EDGE

## (undated) DEVICE — ADHESIVE LIQ 2/3ML VI MASTISOL

## (undated) DEVICE — CAUTERY: TIP CLEANER XR 100/CS: Brand: MEDICAL ACTION INDUSTRIES

## (undated) DEVICE — SOLUTION PREP 10% POVIDONE IOD 32OZ BTL